# Patient Record
Sex: FEMALE | Race: ASIAN | NOT HISPANIC OR LATINO | URBAN - METROPOLITAN AREA
[De-identification: names, ages, dates, MRNs, and addresses within clinical notes are randomized per-mention and may not be internally consistent; named-entity substitution may affect disease eponyms.]

---

## 2017-08-07 ENCOUNTER — EMERGENCY (EMERGENCY)
Facility: HOSPITAL | Age: 32
LOS: 1 days | Discharge: HOME | End: 2017-08-07
Admitting: EMERGENCY MEDICINE
Payer: MEDICAID

## 2017-08-07 VITALS
OXYGEN SATURATION: 100 % | SYSTOLIC BLOOD PRESSURE: 119 MMHG | DIASTOLIC BLOOD PRESSURE: 68 MMHG | TEMPERATURE: 98 F | RESPIRATION RATE: 16 BRPM | HEART RATE: 85 BPM

## 2017-08-07 VITALS
RESPIRATION RATE: 18 BRPM | TEMPERATURE: 97 F | OXYGEN SATURATION: 100 % | HEART RATE: 94 BPM | SYSTOLIC BLOOD PRESSURE: 129 MMHG | DIASTOLIC BLOOD PRESSURE: 83 MMHG

## 2017-08-07 LAB
ALBUMIN SERPL ELPH-MCNC: 4.6 G/DL — SIGNIFICANT CHANGE UP (ref 3.3–5)
ALP SERPL-CCNC: 96 U/L — SIGNIFICANT CHANGE UP (ref 40–120)
ALT FLD-CCNC: 26 U/L — SIGNIFICANT CHANGE UP (ref 4–33)
APPEARANCE UR: CLEAR — SIGNIFICANT CHANGE UP
APTT BLD: 31.7 SEC — SIGNIFICANT CHANGE UP (ref 27.5–37.4)
AST SERPL-CCNC: 15 U/L — SIGNIFICANT CHANGE UP (ref 4–32)
BASOPHILS # BLD AUTO: 0.02 K/UL — SIGNIFICANT CHANGE UP (ref 0–0.2)
BASOPHILS NFR BLD AUTO: 0.2 % — SIGNIFICANT CHANGE UP (ref 0–2)
BILIRUB SERPL-MCNC: 0.2 MG/DL — SIGNIFICANT CHANGE UP (ref 0.2–1.2)
BILIRUB UR-MCNC: NEGATIVE — SIGNIFICANT CHANGE UP
BLD GP AB SCN SERPL QL: NEGATIVE — SIGNIFICANT CHANGE UP
BLOOD UR QL VISUAL: HIGH
BUN SERPL-MCNC: 10 MG/DL — SIGNIFICANT CHANGE UP (ref 7–23)
CALCIUM SERPL-MCNC: 9.3 MG/DL — SIGNIFICANT CHANGE UP (ref 8.4–10.5)
CHLORIDE SERPL-SCNC: 104 MMOL/L — SIGNIFICANT CHANGE UP (ref 98–107)
CO2 SERPL-SCNC: 22 MMOL/L — SIGNIFICANT CHANGE UP (ref 22–31)
COLOR SPEC: SIGNIFICANT CHANGE UP
CREAT SERPL-MCNC: 0.74 MG/DL — SIGNIFICANT CHANGE UP (ref 0.5–1.3)
EOSINOPHIL # BLD AUTO: 0.04 K/UL — SIGNIFICANT CHANGE UP (ref 0–0.5)
EOSINOPHIL NFR BLD AUTO: 0.5 % — SIGNIFICANT CHANGE UP (ref 0–6)
GLUCOSE SERPL-MCNC: 104 MG/DL — HIGH (ref 70–99)
GLUCOSE UR-MCNC: NEGATIVE — SIGNIFICANT CHANGE UP
HCG SERPL-ACNC: < 5 MIU/ML — SIGNIFICANT CHANGE UP
HCT VFR BLD CALC: 41.6 % — SIGNIFICANT CHANGE UP (ref 34.5–45)
HGB BLD-MCNC: 13.7 G/DL — SIGNIFICANT CHANGE UP (ref 11.5–15.5)
IMM GRANULOCYTES # BLD AUTO: 0.02 # — SIGNIFICANT CHANGE UP
IMM GRANULOCYTES NFR BLD AUTO: 0.2 % — SIGNIFICANT CHANGE UP (ref 0–1.5)
INR BLD: 0.98 — SIGNIFICANT CHANGE UP (ref 0.88–1.17)
KETONES UR-MCNC: NEGATIVE — SIGNIFICANT CHANGE UP
LEUKOCYTE ESTERASE UR-ACNC: NEGATIVE — SIGNIFICANT CHANGE UP
LYMPHOCYTES # BLD AUTO: 2.75 K/UL — SIGNIFICANT CHANGE UP (ref 1–3.3)
LYMPHOCYTES # BLD AUTO: 31.9 % — SIGNIFICANT CHANGE UP (ref 13–44)
MCHC RBC-ENTMCNC: 29.7 PG — SIGNIFICANT CHANGE UP (ref 27–34)
MCHC RBC-ENTMCNC: 32.9 % — SIGNIFICANT CHANGE UP (ref 32–36)
MCV RBC AUTO: 90.2 FL — SIGNIFICANT CHANGE UP (ref 80–100)
MONOCYTES # BLD AUTO: 0.6 K/UL — SIGNIFICANT CHANGE UP (ref 0–0.9)
MONOCYTES NFR BLD AUTO: 7 % — SIGNIFICANT CHANGE UP (ref 2–14)
MUCOUS THREADS # UR AUTO: SIGNIFICANT CHANGE UP
NEUTROPHILS # BLD AUTO: 5.2 K/UL — SIGNIFICANT CHANGE UP (ref 1.8–7.4)
NEUTROPHILS NFR BLD AUTO: 60.2 % — SIGNIFICANT CHANGE UP (ref 43–77)
NITRITE UR-MCNC: NEGATIVE — SIGNIFICANT CHANGE UP
NRBC # FLD: 0 — SIGNIFICANT CHANGE UP
PH UR: 7 — SIGNIFICANT CHANGE UP (ref 4.6–8)
PLATELET # BLD AUTO: 126 K/UL — LOW (ref 150–400)
PMV BLD: 12.8 FL — SIGNIFICANT CHANGE UP (ref 7–13)
POTASSIUM SERPL-MCNC: 4.1 MMOL/L — SIGNIFICANT CHANGE UP (ref 3.5–5.3)
POTASSIUM SERPL-SCNC: 4.1 MMOL/L — SIGNIFICANT CHANGE UP (ref 3.5–5.3)
PROT SERPL-MCNC: 7.8 G/DL — SIGNIFICANT CHANGE UP (ref 6–8.3)
PROT UR-MCNC: NEGATIVE — SIGNIFICANT CHANGE UP
PROTHROM AB SERPL-ACNC: 11 SEC — SIGNIFICANT CHANGE UP (ref 9.8–13.1)
RBC # BLD: 4.61 M/UL — SIGNIFICANT CHANGE UP (ref 3.8–5.2)
RBC # FLD: 13.4 % — SIGNIFICANT CHANGE UP (ref 10.3–14.5)
RBC CASTS # UR COMP ASSIST: SIGNIFICANT CHANGE UP (ref 0–?)
RH IG SCN BLD-IMP: POSITIVE — SIGNIFICANT CHANGE UP
SODIUM SERPL-SCNC: 140 MMOL/L — SIGNIFICANT CHANGE UP (ref 135–145)
SP GR SPEC: 1.01 — SIGNIFICANT CHANGE UP (ref 1–1.03)
SQUAMOUS # UR AUTO: SIGNIFICANT CHANGE UP
UROBILINOGEN FLD QL: NORMAL E.U. — SIGNIFICANT CHANGE UP (ref 0.1–0.2)
WBC # BLD: 8.63 K/UL — SIGNIFICANT CHANGE UP (ref 3.8–10.5)
WBC # FLD AUTO: 8.63 K/UL — SIGNIFICANT CHANGE UP (ref 3.8–10.5)
WBC UR QL: SIGNIFICANT CHANGE UP (ref 0–?)

## 2017-08-07 PROCEDURE — 76830 TRANSVAGINAL US NON-OB: CPT | Mod: 26

## 2017-08-07 PROCEDURE — 99285 EMERGENCY DEPT VISIT HI MDM: CPT

## 2017-08-07 RX ORDER — SODIUM CHLORIDE 9 MG/ML
1000 INJECTION INTRAMUSCULAR; INTRAVENOUS; SUBCUTANEOUS ONCE
Qty: 0 | Refills: 0 | Status: COMPLETED | OUTPATIENT
Start: 2017-08-07 | End: 2017-08-07

## 2017-08-07 RX ORDER — ACETAMINOPHEN 500 MG
650 TABLET ORAL ONCE
Qty: 0 | Refills: 0 | Status: COMPLETED | OUTPATIENT
Start: 2017-08-07 | End: 2017-08-07

## 2017-08-07 RX ADMIN — SODIUM CHLORIDE 1000 MILLILITER(S): 9 INJECTION INTRAMUSCULAR; INTRAVENOUS; SUBCUTANEOUS at 21:08

## 2017-08-07 NOTE — ED PROVIDER NOTE - FAMILY HISTORY
Father  Still living? Unknown  Family history of essential hypertension, Age at diagnosis: Age Unknown

## 2017-08-07 NOTE — ED PROVIDER NOTE - OBJECTIVE STATEMENT
32 year old woman with no history of fibroids and b/l ovarian cysts presents with RLQ pain. Patient states that always had normal periods with a little bit of pain until 4 months ago when she began to have severe RLQ pain and heavy bleeding soaking 7-8 pads per day for 5 days with menstruation. Today is day three of her period, and she states that the pain has been unbearable over the last 2 nights leading here to come to the ED. She endorses minimal relief with ibuprofen. Patient also endorses shortness of breath, tiredness and heart racing after walking 2 blocks over the past 2 months. She denies N/V/D, dysuria, SOB at rest, Chest pain, dizziness, change in vision. 32 year old female with pmh of fibroids and b/l ovarian cysts presents with RLQ pain. Patient states that always had normal periods with a little bit of pain until 4 months ago when she began to have severe RLQ pain and heavy bleeding soaking 7-8 pads per day for 5 days with menstruation. Today is day three of her period, and she states that the pain has been unbearable over the last 2 nights leading here to come to the ED. She endorses minimal relief with ibuprofen. Patient also endorses shortness of breath, tiredness and heart racing after walking 2 blocks over the past 2 months. She denies headache, nck pain, f/c/n/v/d, chest pain, urinary symptoms, numbness/weakness/tingling, recent travel, sick contact, social history

## 2017-08-07 NOTE — ED ADULT NURSE NOTE - OBJECTIVE STATEMENT
Pt received into intake room 4 co Vaginal bleeding and RLQ ABD pain. Patient states that always had normal periods with a little bit of pain until 4 months ago when she began to have severe RLQ pain and heavy bleeding soaking 7-8 pads per day for 5 days with menstruation. Today is day three of her period, and she states that the pain has been unbearable over the last 2 nights leading here to come to the ED. Pt also co dizzyness, light headed and palpitations.Pt A&Ox4, in NAD, patient states pain has resolved since arriving at ED. Pt denies N/V/D, dysuria, SOB at rest, CP, change in vision. Md evaluated, VS as noted, 20 G IV placed to L AC, labs sent. fluids infusing as ordered. Will continue to monitor closely.

## 2017-08-07 NOTE — ED ADULT TRIAGE NOTE - CHIEF COMPLAINT QUOTE
Pt c/o RLQ pain and vaginal bleeding, arrives with ultrasound report from PMD which shows bilateral hemorrhagic ovarian cysts.

## 2017-08-07 NOTE — ED PROVIDER NOTE - CARE PLAN
Principal Discharge DX:	Fibroids  Instructions for follow-up, activity and diet:	pls rest, drink plenty of fluids, take motrin every 8 hours , f/u with your obgyn asap, return for any worsening symptoms or any other concerning symptoms

## 2017-08-07 NOTE — ED PROVIDER NOTE - PLAN OF CARE
pls rest, drink plenty of fluids, take motrin every 8 hours , f/u with your obgyn asap, return for any worsening symptoms or any other concerning symptoms

## 2021-01-14 NOTE — ED ADULT TRIAGE NOTE - TEMPERATURE IN FAHRENHEIT (DEGREES F)
Received request via: Patient    Was the patient seen in the last year in this department? Yes    Does the patient have an active prescription (recently filled or refills available) for medication(s) requested? No  
97

## 2021-07-10 ENCOUNTER — INPATIENT (INPATIENT)
Facility: HOSPITAL | Age: 36
LOS: 1 days | Discharge: HOME | End: 2021-07-12
Attending: SURGERY | Admitting: SURGERY
Payer: COMMERCIAL

## 2021-07-10 ENCOUNTER — TRANSCRIPTION ENCOUNTER (OUTPATIENT)
Age: 36
End: 2021-07-10

## 2021-07-10 VITALS
TEMPERATURE: 98 F | DIASTOLIC BLOOD PRESSURE: 76 MMHG | RESPIRATION RATE: 17 BRPM | SYSTOLIC BLOOD PRESSURE: 137 MMHG | OXYGEN SATURATION: 100 % | HEART RATE: 123 BPM

## 2021-07-10 LAB
ALBUMIN SERPL ELPH-MCNC: 4 G/DL — SIGNIFICANT CHANGE UP (ref 3.3–5)
ALP SERPL-CCNC: 161 U/L — HIGH (ref 40–120)
ALT FLD-CCNC: 62 U/L — HIGH (ref 4–33)
ANION GAP SERPL CALC-SCNC: 14 MMOL/L — SIGNIFICANT CHANGE UP (ref 7–14)
APPEARANCE UR: ABNORMAL
APTT BLD: 29.6 SEC — SIGNIFICANT CHANGE UP (ref 27–36.3)
AST SERPL-CCNC: 27 U/L — SIGNIFICANT CHANGE UP (ref 4–32)
BACTERIA # UR AUTO: ABNORMAL
BASE EXCESS BLDV CALC-SCNC: -0.6 MMOL/L — SIGNIFICANT CHANGE UP (ref -3–2)
BILIRUB SERPL-MCNC: 0.4 MG/DL — SIGNIFICANT CHANGE UP (ref 0.2–1.2)
BILIRUB UR-MCNC: NEGATIVE — SIGNIFICANT CHANGE UP
BLD GP AB SCN SERPL QL: NEGATIVE — SIGNIFICANT CHANGE UP
BLOOD GAS VENOUS - CREATININE: 0.9 MG/DL — SIGNIFICANT CHANGE UP (ref 0.5–1.3)
BLOOD GAS VENOUS COMPREHENSIVE RESULT: SIGNIFICANT CHANGE UP
BUN SERPL-MCNC: 6 MG/DL — LOW (ref 7–23)
CALCIUM SERPL-MCNC: 9 MG/DL — SIGNIFICANT CHANGE UP (ref 8.4–10.5)
CHLORIDE BLDV-SCNC: 108 MMOL/L — SIGNIFICANT CHANGE UP (ref 96–108)
CHLORIDE SERPL-SCNC: 104 MMOL/L — SIGNIFICANT CHANGE UP (ref 98–107)
CO2 SERPL-SCNC: 20 MMOL/L — LOW (ref 22–31)
COLOR SPEC: SIGNIFICANT CHANGE UP
CREAT SERPL-MCNC: 0.77 MG/DL — SIGNIFICANT CHANGE UP (ref 0.5–1.3)
DIFF PNL FLD: NEGATIVE — SIGNIFICANT CHANGE UP
EPI CELLS # UR: 2 /HPF — SIGNIFICANT CHANGE UP (ref 0–5)
GAS PNL BLDV: 140 MMOL/L — SIGNIFICANT CHANGE UP (ref 136–146)
GLUCOSE BLDV-MCNC: 147 MG/DL — HIGH (ref 70–99)
GLUCOSE SERPL-MCNC: 137 MG/DL — HIGH (ref 70–99)
GLUCOSE UR QL: NEGATIVE — SIGNIFICANT CHANGE UP
HCO3 BLDV-SCNC: 23 MMOL/L — SIGNIFICANT CHANGE UP (ref 20–27)
HCT VFR BLD CALC: 34.1 % — LOW (ref 34.5–45)
HCT VFR BLDA CALC: 33.9 % — LOW (ref 34.5–46.5)
HGB BLD CALC-MCNC: 11 G/DL — LOW (ref 11.5–15.5)
HGB BLD-MCNC: 10.8 G/DL — LOW (ref 11.5–15.5)
HYALINE CASTS # UR AUTO: 1 /LPF — SIGNIFICANT CHANGE UP (ref 0–7)
INR BLD: 1.21 RATIO — HIGH (ref 0.88–1.16)
KETONES UR-MCNC: NEGATIVE — SIGNIFICANT CHANGE UP
LACTATE BLDV-MCNC: 1.9 MMOL/L — SIGNIFICANT CHANGE UP (ref 0.5–2)
LEUKOCYTE ESTERASE UR-ACNC: NEGATIVE — SIGNIFICANT CHANGE UP
MCHC RBC-ENTMCNC: 27.6 PG — SIGNIFICANT CHANGE UP (ref 27–34)
MCHC RBC-ENTMCNC: 31.7 GM/DL — LOW (ref 32–36)
MCV RBC AUTO: 87 FL — SIGNIFICANT CHANGE UP (ref 80–100)
NITRITE UR-MCNC: NEGATIVE — SIGNIFICANT CHANGE UP
NRBC # BLD: 0 /100 WBCS — SIGNIFICANT CHANGE UP
NRBC # FLD: 0 K/UL — SIGNIFICANT CHANGE UP
PCO2 BLDV: 45 MMHG — SIGNIFICANT CHANGE UP (ref 41–51)
PH BLDV: 7.35 — SIGNIFICANT CHANGE UP (ref 7.32–7.43)
PH UR: 6.5 — SIGNIFICANT CHANGE UP (ref 5–8)
PLATELET # BLD AUTO: 123 K/UL — LOW (ref 150–400)
PO2 BLDV: 26 MMHG — LOW (ref 35–40)
POTASSIUM BLDV-SCNC: 3.4 MMOL/L — SIGNIFICANT CHANGE UP (ref 3.4–4.5)
POTASSIUM SERPL-MCNC: 3.7 MMOL/L — SIGNIFICANT CHANGE UP (ref 3.5–5.3)
POTASSIUM SERPL-SCNC: 3.7 MMOL/L — SIGNIFICANT CHANGE UP (ref 3.5–5.3)
PROT SERPL-MCNC: 7.8 G/DL — SIGNIFICANT CHANGE UP (ref 6–8.3)
PROT UR-MCNC: ABNORMAL
PROTHROM AB SERPL-ACNC: 13.7 SEC — HIGH (ref 10.6–13.6)
RBC # BLD: 3.92 M/UL — SIGNIFICANT CHANGE UP (ref 3.8–5.2)
RBC # FLD: 13.9 % — SIGNIFICANT CHANGE UP (ref 10.3–14.5)
RBC CASTS # UR COMP ASSIST: 3 /HPF — SIGNIFICANT CHANGE UP (ref 0–4)
RH IG SCN BLD-IMP: POSITIVE — SIGNIFICANT CHANGE UP
SAO2 % BLDV: 36.2 % — LOW (ref 60–85)
SARS-COV-2 RNA SPEC QL NAA+PROBE: SIGNIFICANT CHANGE UP
SODIUM SERPL-SCNC: 138 MMOL/L — SIGNIFICANT CHANGE UP (ref 135–145)
SP GR SPEC: 1.01 — SIGNIFICANT CHANGE UP (ref 1.01–1.02)
UROBILINOGEN FLD QL: SIGNIFICANT CHANGE UP
WBC # BLD: 20.72 K/UL — HIGH (ref 3.8–10.5)
WBC # FLD AUTO: 20.72 K/UL — HIGH (ref 3.8–10.5)
WBC UR QL: 2 /HPF — SIGNIFICANT CHANGE UP (ref 0–5)

## 2021-07-10 PROCEDURE — 99285 EMERGENCY DEPT VISIT HI MDM: CPT | Mod: 25

## 2021-07-10 PROCEDURE — 93010 ELECTROCARDIOGRAM REPORT: CPT

## 2021-07-10 PROCEDURE — 74177 CT ABD & PELVIS W/CONTRAST: CPT | Mod: 26

## 2021-07-10 RX ORDER — VANCOMYCIN HCL 1 G
1000 VIAL (EA) INTRAVENOUS ONCE
Refills: 0 | Status: COMPLETED | OUTPATIENT
Start: 2021-07-10 | End: 2021-07-10

## 2021-07-10 RX ORDER — ACETAMINOPHEN 500 MG
1000 TABLET ORAL ONCE
Refills: 0 | Status: COMPLETED | OUTPATIENT
Start: 2021-07-10 | End: 2021-07-10

## 2021-07-10 RX ORDER — SODIUM CHLORIDE 9 MG/ML
1000 INJECTION INTRAMUSCULAR; INTRAVENOUS; SUBCUTANEOUS ONCE
Refills: 0 | Status: COMPLETED | OUTPATIENT
Start: 2021-07-10 | End: 2021-07-10

## 2021-07-10 RX ORDER — CEFOTETAN DISODIUM 1 G
2 VIAL (EA) INJECTION ONCE
Refills: 0 | Status: COMPLETED | OUTPATIENT
Start: 2021-07-10 | End: 2021-07-10

## 2021-07-10 RX ORDER — DIPHENHYDRAMINE HCL 50 MG
50 CAPSULE ORAL ONCE
Refills: 0 | Status: COMPLETED | OUTPATIENT
Start: 2021-07-10 | End: 2021-07-10

## 2021-07-10 RX ORDER — MORPHINE SULFATE 50 MG/1
4 CAPSULE, EXTENDED RELEASE ORAL ONCE
Refills: 0 | Status: DISCONTINUED | OUTPATIENT
Start: 2021-07-10 | End: 2021-07-10

## 2021-07-10 RX ADMIN — Medication 110 MILLIGRAM(S): at 23:51

## 2021-07-10 RX ADMIN — Medication 50 MILLIGRAM(S): at 20:01

## 2021-07-10 RX ADMIN — Medication 250 MILLIGRAM(S): at 18:57

## 2021-07-10 RX ADMIN — SODIUM CHLORIDE 1000 MILLILITER(S): 9 INJECTION INTRAMUSCULAR; INTRAVENOUS; SUBCUTANEOUS at 18:57

## 2021-07-10 RX ADMIN — Medication 400 MILLIGRAM(S): at 18:46

## 2021-07-10 RX ADMIN — MORPHINE SULFATE 4 MILLIGRAM(S): 50 CAPSULE, EXTENDED RELEASE ORAL at 22:54

## 2021-07-10 RX ADMIN — Medication 1000 MILLIGRAM(S): at 19:20

## 2021-07-10 NOTE — ED ADULT TRIAGE NOTE - CHIEF COMPLAINT QUOTE
Pt c/o right and left sided buttock pain starting 3 days ago. Pt denies injury. Pt tachycardic in triage, denies cp Pt c/o right and left sided buttock pain starting 3 days ago. Pt denies injury. Pt tachycardic in triage, denies cp    Abnormal EKG

## 2021-07-10 NOTE — ED PROVIDER NOTE - CHIEF COMPLAINT
The patient is a 36y Female complaining of  The patient is a 36y Female complaining of buttock pain.

## 2021-07-10 NOTE — ED ADULT NURSE NOTE - CHIEF COMPLAINT QUOTE
Pt c/o right and left sided buttock pain starting 3 days ago. Pt denies injury. Pt tachycardic in triage, denies cp    Abnormal EKG

## 2021-07-10 NOTE — ED PROVIDER NOTE - ATTENDING CONTRIBUTION TO CARE
Juan Alberto Nichols DO:  patient seen and evaluated with the resident.  I was present for key portions of the History & Physical, and I agree with the Impression & Plan. 37 yo f no reported pmh, pw buttock pain. reports three days of sx, r cleft, sharp, worse w/ pressure, no prior hx of similar sx. no recent procedures, no shaving, no anal insertive practices. arrives tachy, febrile. reported "ekg abrormal" however appears sinus tach. exam chaperone by Dr. Rubin. r cleft w/ erythema and induration with NO fluctuance noted. non diabetic. soft tissue infection evident, will treat empirically. eval for deep space infection. likely tba.

## 2021-07-10 NOTE — ED ADULT NURSE REASSESSMENT NOTE - NS ED NURSE REASSESS COMMENT FT1
Pt resting comfortably. Baseline mental status. Receiving IV abx as ordered. Verbalizing partial improvement in pain. Sinus tachycardia on monitor. Will continue to monitor.

## 2021-07-10 NOTE — ED PROVIDER NOTE - PHYSICAL EXAMINATION
GENERAL: Female in moderate distress.   HEENT: Normocephalic. PERRLA.   CHEST: Normal S1, S2. RRR. No m/r/g.  PULM: Good inspiratory effort. CTAB. No wheezes, crackles.   ABD: +BS. Soft, non-tender, non-distended.   EXT: No edema.   SKIN: Warmth, erythema, nonfluctuant raised GENERAL: Female in moderate distress.   HEENT: Normocephalic. PERRLA.   CHEST: Normal S1, S2. RRR. No m/r/g.  PULM: Good inspiratory effort. CTAB. No wheezes, crackles.   ABD: +BS. Soft, non-tender, non-distended.   EXT: No edema.   SKIN: Warm, tender, nonfluctuant raised erythematous area on right buttock, close to anus. marked with pen.

## 2021-07-10 NOTE — ED PROVIDER NOTE - OBJECTIVE STATEMENT
35yo F with PMH of fibroids and b/l ovarian cysts presents with Right-sided buttock pain. Patient states that 3 days ago, she started to have intense pain in her right buttocks, to the point where she cannot sit down. She has taken motrin and tylenol with relief for only 1-2 hours before pain starts again. Patient is sexually active, but denies anal intercourse. No known sick contacts, no travel. Denies headaches, nausea, vomiting, abdominal pain, weakness.

## 2021-07-10 NOTE — ED ADULT NURSE NOTE - OBJECTIVE STATEMENT
Faciltiator RN note- Received pt in spot 13. [id number]. C/o pain to right inner buttock since last weekend. Pt denies fevers at home but found to be rectally febrile on arrival to . Redness noted to inner right buttock. 20G placed to left AC, labs sent. Rpt given to primary RNs Brittney & Dominique. Will continue to monitor.

## 2021-07-10 NOTE — ED PROVIDER NOTE - CLINICAL SUMMARY MEDICAL DECISION MAKING FREE TEXT BOX
see attg attestation. 37yo F with PMH of fibroids and b/l ovarian cysts presents with Right-sided buttock pain. Patient tachycardic and febrile. On exam, warm, tender, nonfluctuant raised erythematous area on right buttock, close to anus. marked with pen. Concern for cellulitis vs. abscess. Workup includes cbc, cmp, vbg, coags, blood cx, t+s, ct a/p. Vancomycin given empirically.

## 2021-07-11 DIAGNOSIS — Z98.890 OTHER SPECIFIED POSTPROCEDURAL STATES: Chronic | ICD-10-CM

## 2021-07-11 DIAGNOSIS — L02.91 CUTANEOUS ABSCESS, UNSPECIFIED: ICD-10-CM

## 2021-07-11 PROBLEM — D25.9 LEIOMYOMA OF UTERUS, UNSPECIFIED: Chronic | Status: ACTIVE | Noted: 2017-08-07

## 2021-07-11 LAB
ANION GAP SERPL CALC-SCNC: 12 MMOL/L — SIGNIFICANT CHANGE UP (ref 7–14)
BLD GP AB SCN SERPL QL: NEGATIVE — SIGNIFICANT CHANGE UP
BUN SERPL-MCNC: 4 MG/DL — LOW (ref 7–23)
CALCIUM SERPL-MCNC: 8.3 MG/DL — LOW (ref 8.4–10.5)
CHLORIDE SERPL-SCNC: 110 MMOL/L — HIGH (ref 98–107)
CO2 SERPL-SCNC: 19 MMOL/L — LOW (ref 22–31)
CREAT SERPL-MCNC: 0.72 MG/DL — SIGNIFICANT CHANGE UP (ref 0.5–1.3)
GLUCOSE SERPL-MCNC: 119 MG/DL — HIGH (ref 70–99)
HCT VFR BLD CALC: 33.3 % — LOW (ref 34.5–45)
HGB BLD-MCNC: 10.3 G/DL — LOW (ref 11.5–15.5)
MAGNESIUM SERPL-MCNC: 2.1 MG/DL — SIGNIFICANT CHANGE UP (ref 1.6–2.6)
MCHC RBC-ENTMCNC: 27 PG — SIGNIFICANT CHANGE UP (ref 27–34)
MCHC RBC-ENTMCNC: 30.9 GM/DL — LOW (ref 32–36)
MCV RBC AUTO: 87.4 FL — SIGNIFICANT CHANGE UP (ref 80–100)
NRBC # BLD: 0 /100 WBCS — SIGNIFICANT CHANGE UP
NRBC # FLD: 0 K/UL — SIGNIFICANT CHANGE UP
PHOSPHATE SERPL-MCNC: 3.4 MG/DL — SIGNIFICANT CHANGE UP (ref 2.5–4.5)
PLATELET # BLD AUTO: 69 K/UL — LOW (ref 150–400)
POTASSIUM SERPL-MCNC: 3.3 MMOL/L — LOW (ref 3.5–5.3)
POTASSIUM SERPL-SCNC: 3.3 MMOL/L — LOW (ref 3.5–5.3)
RBC # BLD: 3.81 M/UL — SIGNIFICANT CHANGE UP (ref 3.8–5.2)
RBC # FLD: 14.1 % — SIGNIFICANT CHANGE UP (ref 10.3–14.5)
RH IG SCN BLD-IMP: POSITIVE — SIGNIFICANT CHANGE UP
SODIUM SERPL-SCNC: 141 MMOL/L — SIGNIFICANT CHANGE UP (ref 135–145)
WBC # BLD: 19.41 K/UL — HIGH (ref 3.8–10.5)
WBC # FLD AUTO: 19.41 K/UL — HIGH (ref 3.8–10.5)

## 2021-07-11 PROCEDURE — 46040 I&D ISCHIORCT&/PERIRCT ABSC: CPT | Mod: GC

## 2021-07-11 PROCEDURE — 99221 1ST HOSP IP/OBS SF/LOW 40: CPT | Mod: 25,57,GC

## 2021-07-11 PROCEDURE — 76856 US EXAM PELVIC COMPLETE: CPT | Mod: 26

## 2021-07-11 RX ORDER — FENTANYL CITRATE 50 UG/ML
50 INJECTION INTRAVENOUS ONCE
Refills: 0 | Status: DISCONTINUED | OUTPATIENT
Start: 2021-07-11 | End: 2021-07-11

## 2021-07-11 RX ORDER — ENOXAPARIN SODIUM 100 MG/ML
40 INJECTION SUBCUTANEOUS DAILY
Refills: 0 | Status: DISCONTINUED | OUTPATIENT
Start: 2021-07-11 | End: 2021-07-12

## 2021-07-11 RX ORDER — HYDROMORPHONE HYDROCHLORIDE 2 MG/ML
0.5 INJECTION INTRAMUSCULAR; INTRAVENOUS; SUBCUTANEOUS EVERY 4 HOURS
Refills: 0 | Status: DISCONTINUED | OUTPATIENT
Start: 2021-07-11 | End: 2021-07-11

## 2021-07-11 RX ORDER — SODIUM CHLORIDE 9 MG/ML
1000 INJECTION, SOLUTION INTRAVENOUS
Refills: 0 | Status: DISCONTINUED | OUTPATIENT
Start: 2021-07-11 | End: 2021-07-11

## 2021-07-11 RX ORDER — PIPERACILLIN AND TAZOBACTAM 4; .5 G/20ML; G/20ML
3.38 INJECTION, POWDER, LYOPHILIZED, FOR SOLUTION INTRAVENOUS ONCE
Refills: 0 | Status: COMPLETED | OUTPATIENT
Start: 2021-07-11 | End: 2021-07-11

## 2021-07-11 RX ORDER — SODIUM CHLORIDE 9 MG/ML
1000 INJECTION INTRAMUSCULAR; INTRAVENOUS; SUBCUTANEOUS ONCE
Refills: 0 | Status: COMPLETED | OUTPATIENT
Start: 2021-07-11 | End: 2021-07-11

## 2021-07-11 RX ORDER — MAGNESIUM HYDROXIDE 400 MG/1
30 TABLET, CHEWABLE ORAL EVERY 6 HOURS
Refills: 0 | Status: DISCONTINUED | OUTPATIENT
Start: 2021-07-11 | End: 2021-07-12

## 2021-07-11 RX ORDER — PIPERACILLIN AND TAZOBACTAM 4; .5 G/20ML; G/20ML
3.38 INJECTION, POWDER, LYOPHILIZED, FOR SOLUTION INTRAVENOUS EVERY 8 HOURS
Refills: 0 | Status: DISCONTINUED | OUTPATIENT
Start: 2021-07-11 | End: 2021-07-12

## 2021-07-11 RX ORDER — HYDROMORPHONE HYDROCHLORIDE 2 MG/ML
0.5 INJECTION INTRAMUSCULAR; INTRAVENOUS; SUBCUTANEOUS
Refills: 0 | Status: DISCONTINUED | OUTPATIENT
Start: 2021-07-11 | End: 2021-07-11

## 2021-07-11 RX ORDER — LIDOCAINE HYDROCHLORIDE AND EPINEPHRINE 10; 10 MG/ML; UG/ML
20 INJECTION, SOLUTION INFILTRATION; PERINEURAL ONCE
Refills: 0 | Status: DISCONTINUED | OUTPATIENT
Start: 2021-07-11 | End: 2021-07-11

## 2021-07-11 RX ORDER — OXYCODONE HYDROCHLORIDE 5 MG/1
10 TABLET ORAL EVERY 6 HOURS
Refills: 0 | Status: DISCONTINUED | OUTPATIENT
Start: 2021-07-11 | End: 2021-07-12

## 2021-07-11 RX ORDER — ACETAMINOPHEN 500 MG
975 TABLET ORAL EVERY 6 HOURS
Refills: 0 | Status: DISCONTINUED | OUTPATIENT
Start: 2021-07-11 | End: 2021-07-12

## 2021-07-11 RX ORDER — OXYCODONE HYDROCHLORIDE 5 MG/1
5 TABLET ORAL EVERY 6 HOURS
Refills: 0 | Status: DISCONTINUED | OUTPATIENT
Start: 2021-07-11 | End: 2021-07-12

## 2021-07-11 RX ORDER — HYDROMORPHONE HYDROCHLORIDE 2 MG/ML
1 INJECTION INTRAMUSCULAR; INTRAVENOUS; SUBCUTANEOUS
Refills: 0 | Status: DISCONTINUED | OUTPATIENT
Start: 2021-07-11 | End: 2021-07-11

## 2021-07-11 RX ORDER — SODIUM CHLORIDE 9 MG/ML
1000 INJECTION INTRAMUSCULAR; INTRAVENOUS; SUBCUTANEOUS
Refills: 0 | Status: DISCONTINUED | OUTPATIENT
Start: 2021-07-11 | End: 2021-07-11

## 2021-07-11 RX ORDER — ONDANSETRON 8 MG/1
4 TABLET, FILM COATED ORAL ONCE
Refills: 0 | Status: DISCONTINUED | OUTPATIENT
Start: 2021-07-11 | End: 2021-07-11

## 2021-07-11 RX ORDER — ACETAMINOPHEN 500 MG
975 TABLET ORAL ONCE
Refills: 0 | Status: COMPLETED | OUTPATIENT
Start: 2021-07-11 | End: 2021-07-11

## 2021-07-11 RX ADMIN — SODIUM CHLORIDE 1000 MILLILITER(S): 9 INJECTION INTRAMUSCULAR; INTRAVENOUS; SUBCUTANEOUS at 04:43

## 2021-07-11 RX ADMIN — Medication 975 MILLIGRAM(S): at 05:42

## 2021-07-11 RX ADMIN — PIPERACILLIN AND TAZOBACTAM 25 GRAM(S): 4; .5 INJECTION, POWDER, LYOPHILIZED, FOR SOLUTION INTRAVENOUS at 13:01

## 2021-07-11 RX ADMIN — MAGNESIUM HYDROXIDE 30 MILLILITER(S): 400 TABLET, CHEWABLE ORAL at 17:34

## 2021-07-11 RX ADMIN — Medication 100 GRAM(S): at 02:18

## 2021-07-11 RX ADMIN — FENTANYL CITRATE 50 MICROGRAM(S): 50 INJECTION INTRAVENOUS at 05:26

## 2021-07-11 RX ADMIN — Medication 2 GRAM(S): at 05:26

## 2021-07-11 RX ADMIN — PIPERACILLIN AND TAZOBACTAM 25 GRAM(S): 4; .5 INJECTION, POWDER, LYOPHILIZED, FOR SOLUTION INTRAVENOUS at 20:16

## 2021-07-11 RX ADMIN — FENTANYL CITRATE 50 MICROGRAM(S): 50 INJECTION INTRAVENOUS at 04:11

## 2021-07-11 RX ADMIN — MORPHINE SULFATE 4 MILLIGRAM(S): 50 CAPSULE, EXTENDED RELEASE ORAL at 05:26

## 2021-07-11 RX ADMIN — Medication 975 MILLIGRAM(S): at 17:34

## 2021-07-11 RX ADMIN — PIPERACILLIN AND TAZOBACTAM 200 GRAM(S): 4; .5 INJECTION, POWDER, LYOPHILIZED, FOR SOLUTION INTRAVENOUS at 05:26

## 2021-07-11 RX ADMIN — ENOXAPARIN SODIUM 40 MILLIGRAM(S): 100 INJECTION SUBCUTANEOUS at 13:01

## 2021-07-11 RX ADMIN — Medication 975 MILLIGRAM(S): at 13:01

## 2021-07-11 RX ADMIN — SODIUM CHLORIDE 125 MILLILITER(S): 9 INJECTION, SOLUTION INTRAVENOUS at 05:34

## 2021-07-11 RX ADMIN — Medication 975 MILLIGRAM(S): at 03:54

## 2021-07-11 NOTE — H&P ADULT - HISTORY OF PRESENT ILLNESS
36 year old F with no PMH presents with approximately one week of worsening right sided buttock pain. No bleeding per rectum, stooling normally daily. Endorses subjective fevers at home. No N/V.  No abdominal pain.  Patient currently menstruating.     In the ED, febrile and tachycardic. Exam limited due to pain but right buttock indurated with mild ertythema. Labs with leukocytosis to 20. CT with complex septated structure posterior to the uterus, likely ovarian in nature, concerning for tubo-ovarian abscess and Enhancing bilobed fluid collection measuring approximately 4.1 x 4.4 x 4.9 cm, concerning for perianal abscess. Pelvic ultrasound was done and septated structure posterior to uterus felt to be more likely to be a hydrosalpinx

## 2021-07-11 NOTE — CHART NOTE - NSCHARTNOTEFT_GEN_A_CORE
Surgery Post-Op Note    Pre-Op Dx: chago-rectal abscess   Procedure: Exam under anesthesia, anus    Incision and drainage of deep rectal abscess      Surgeon: Dr. Pia Anderson    SUBJECTIVE:  Pt seen and examined at the bedside 4 hours after surgery. Pt w/ no complaints. Denies F/C/N/V. Pain controlled with medication.     OBJECTIVE:  Vital Signs Last 24 Hrs  T(C): 37 (2021 12:55), Max: 38.6 (10 Jul 2021 17:15)  T(F): 98.6 (2021 12:55), Max: 101.5 (10 Jul 2021 17:15)  HR: 90 (2021 12:55) (85 - 125)  BP: 110/64 (2021 12:55) (91/59 - 137/76)  BP(mean): 67 (2021 10:45) (67 - 71)  RR: 17 (2021 12:55) (15 - 20)  SpO2: 96% (2021 12:55) (95% - 100%)    Physical Exam:  General: NAD, resting comfortably in bed  Neuro: A/O x 3, no focal deficits  Pulmonary: Nonlabored breathing, no respiratory distress  Cardiovascular: NSR  Abdominal: soft, ATTP. ND  Incision: C/D/I   Extremities: WWP    LABS:                        10.3   19.41 )-----------( 69       ( 2021 08:13 )             33.3     07-11    141  |  110<H>  |  4<L>  ----------------------------<  119<H>  3.3<L>   |  19<L>  |  0.72    Ca    8.3<L>      2021 08:14  Phos  3.4     07-11  Mg     2.10     07-11    TPro  7.8  /  Alb  4.0  /  TBili  0.4  /  DBili  x   /  AST  27  /  ALT  62<H>  /  AlkPhos  161<H>  07-10    PT/INR - ( 10 Jul 2021 17:26 )   PT: 13.7 sec;   INR: 1.21 ratio         PTT - ( 10 Jul 2021 17:26 )  PTT:29.6 sec  CAPILLARY BLOOD GLUCOSE        Urinalysis Basic - ( 10 Jul 2021 19:05 )    Color: Light Yellow / Appearance: Slightly Turbid / S.011 / pH: x  Gluc: x / Ketone: Negative  / Bili: Negative / Urobili: <2 mg/dL   Blood: x / Protein: Trace / Nitrite: Negative   Leuk Esterase: Negative / RBC: 3 /HPF / WBC 2 /HPF   Sq Epi: x / Non Sq Epi: 2 /HPF / Bacteria: Occasional      LIVER FUNCTIONS - ( 10 Jul 2021 17:26 )  Alb: 4.0 g/dL / Pro: 7.8 g/dL / ALK PHOS: 161 U/L / ALT: 62 U/L / AST: 27 U/L / GGT: x           ABO Interpretation: O ( @ 07:50)      IMAGING:    ASSESSMENT:36y Female now 4hours s/p     PLAN:  - Pain control  - Encourage IS  - Nausea control PRN  - Monitor vitals  - Diet: regular  - Monitor I+Os  - OOB/ Ambulate  - DVT ppx:      Team Surgery  [id number]

## 2021-07-11 NOTE — H&P ADULT - ATTENDING COMMENTS
Pain prohibits adequate examination and management of this perirectal abscess at bedside.  Proceed with EUA and operative drainage; will refer to CRS postop for further evaluation for [id number]-pm-hwe.  Priority right now is abscess drainage for infection source control.

## 2021-07-11 NOTE — BRIEF OPERATIVE NOTE - NSICDXBRIEFPROCEDURE_GEN_ALL_CORE_FT
PROCEDURES:  Exam under anesthesia, anus 11-Jul-2021 10:16:11  Renita Milian  Incision and drainage of deep rectal abscess 11-Jul-2021 10:16:27  Renita Milian

## 2021-07-11 NOTE — BRIEF OPERATIVE NOTE - OPERATION/FINDINGS
RIGHT posterolateral abscess pocket entered with copious purulent drainage.  Incision made about 5cm from the anal verge on RIGHT buttock.  Multiple loculations encountered and broken up with clamp and digit.  Counter incision made in posterior midline.  Tract made to connect incsions.  Penrose drain left in place connecting them.  Local anesthetic given at the beginning and end of case.  Dressing with 4x4 and abd pad.

## 2021-07-11 NOTE — H&P ADULT - ASSESSMENT
36 year old F with perianal abscess  -Admit to surgery   -IV antibiotics  -Booked and consented in [facility] for OR for EUA, incision and drainage of abscess. Patient aware she may have a drain in place post-operatively and also aware of the risk of fistula formation.       PADMINI BENTLEY Team Surgery [id number]

## 2021-07-11 NOTE — CONSULT NOTE ADULT - SUBJECTIVE AND OBJECTIVE BOX
TARAN RIBERA  36y  Female [id number]    HPI: 36 year old G0, LMP unknown (patient unable to recall but states she has normal periods) presenting with a week history of pain on her buttock when she sits/ bends/ movement. Patient states that the pain has been getting worse as the week progressed. She has tried using Advil and Ibuprofen but nothing seems to help. She reports that her last bowel movement was this morning. Denies any rectal bleeding. Patient denies any lightheadedness, headaches, fever, chills, chest pain, SOB, abdominal pain, nausea, vomiting, diarrhea vaginal bleeding, changes to vaginal discharge.      On presentation to the ED patient had a fever of 101.5 and was tachycardic to123. Patient denies having chills, palpitations, chest pain or SOB. Patient was given vancomycin, Doxycycline and Tylenol.        Name of GYN Physician: Dr. Pathak    POB:  [facility]    Pgyn: Denies fibroids, cysts, STI's, Abnormal pap smears, Currently sexually active , last intercourse 2 weeks.    PMH:None  PSH:Cystectomy 2018 ([facility])    Home meds: None   Social History:  Denies smoking use, drug use, alcohol use.     Vital Signs Last 24 Hrs  T(C): 37.6 (10 Jul 2021 22:35), Max: 38.6 (10 Jul 2021 17:15)  T(F): 99.6 (10 Jul 2021 22:35), Max: 101.5 (10 Jul 2021 17:15)  HR: 105 (10 Jul 2021 22:35) (105 - 125)  BP: 105/54 (10 Jul 2021 22:35) (105/54 - 137/76)  BP(mean): --  RR: 16 (10 Jul 2021 22:35) (16 - 18)  SpO2: 100% (10 Jul 2021 22:35) (100% - 100%)    Physical Exam:   General: sitting comfortably in bed, NAD   CV: RR, S1S2 present, no m/r/g  Lungs: CTA b/l  Back: No CVA tenderness  Abd: Soft, non-tender, non-distended.  Bowel sounds present.    :  No bleeding on pad.    External labia wnl.  Bimanual exam with cervical motion tenderness predominantly on left side, uterus wnl, adnexa non palpable b/l.    Speculum Exam: No active bleeding from os.  Physiologic discharge.     Rectal: Right sided pain on Buttock to palpation, 2x4 cm tense area noted. No erythema.  Ext: non-tender b/l, no edema     LABS:                            10.8   20.72 )-----------( 123      ( 10 Jul 2021 17:26 )             34.1     07-10    138  |  104  |  6<L>  ----------------------------<  137<H>  3.7   |  20<L>  |  0.77    Ca    9.0      10 Jul 2021 17:26    TPro  7.8  /  Alb  4.0  /  TBili  0.4  /  DBili  x   /  AST  27  /  ALT  62<H>  /  AlkPhos  161<H>  07-10    I&O's Detail    PT/INR - ( 10 Jul 2021 17:26 )   PT: 13.7 sec;   INR: 1.21 ratio         PTT - ( 10 Jul 2021 17:26 )  PTT:29.6 sec  Urinalysis Basic - ( 10 Jul 2021 19:05 )    Color: Light Yellow / Appearance: Slightly Turbid / S.011 / pH: x  Gluc: x / Ketone: Negative  / Bili: Negative / Urobili: <2 mg/dL   Blood: x / Protein: Trace / Nitrite: Negative   Leuk Esterase: Negative / RBC: 3 /HPF / WBC 2 /HPF   Sq Epi: x / Non Sq Epi: 2 /HPF / Bacteria: Occasional        RADIOLOGY & ADDITIONAL STUDIES:  TVUS: Uterus: 11.4 x 6.2 x 9.6 cm. Fibroid uterus.  Endometrium: 1 cm.Within normal limits.    Right ovary: 3.1 x 1.4 x 2.3 cm. Within normal limits. Normal arterial and venous waveforms.  Left ovary: 3.2 x 4.7 x 4.1 cm. Left adnexal fluid-filled tubular structure with a hypoechoic avascular structure measuring 4.6 x 5.8 x 4.1 cm.    Fluid: Right adnexal free fluid.    IMPRESSION:  Left adnexal fluid-filled tubular structure without surrounding hypervascularity, suggestive of hydrosalpinx. Consider further evaluation with MRI.      --- End of Report ---     CT: LOWER CHEST: Within normal limits.    LIVER: 5 mm calcified lesion, likely granuloma.  BILE DUCTS: Normal caliber.  GALLBLADDER: Within normal limits.  SPLEEN: Within normal limits.  PANCREAS: Within normal limits.  ADRENALS: Within normal limits.  KIDNEYS/URETERS: Within normal limits.    BLADDER: Bladder is displaced anteriorly and superiorly.  REPRODUCTIVE ORGANS: Enlarged fibroid uterus. Complex septated structure posterior to the uterus, likely ovarian in nature, concerning for tubo-ovarian abscess.    BOWEL: Narrowing of the cecum due to fibroid uterus and complex fluid containing septated mass without evidence of bowel obstruction. Appendix is normal.  PERITONEUM: No ascites.  VESSELS: Within normal limits.  RETROPERITONEUM/LYMPH NODES: No lymphadenopathy.  ABDOMINAL WALL:Enhancing bilobed fluid collection measuring approximately 4.1 x 4.4 x 4.9 cm, concerning for perianal abscess.  BONES: Within normal limits.    IMPRESSION:  Complex septated structure posterior to the uterus, likely ovarian in nature, concerning fortubo-ovarian abscess.    Enhancing bilobed fluid collection measuring approximately 4.1 x 4.4 x 4.9 cm, concerning for perianal abscess.        --- End of Report ---

## 2021-07-11 NOTE — ED ADULT NURSE REASSESSMENT NOTE - REASSESS COMMUNICATION
MD notifed BP 90's 2nd iv access placed, fluid bolus intiated. reassessment to follow/ED physician notified

## 2021-07-11 NOTE — H&P ADULT - NSHPPHYSICALEXAM_GEN_ALL_CORE
Uncomfortable appearing, awake, alert, conversant   Tachycardic   normal respiratory effort on room air, no wheezing  Abd soft, NT, ND  Rectal: induration and tenderness to palpation on the right buttock. TAMIKO not done secondary to pain  Ext: DARRELLP

## 2021-07-11 NOTE — ED ADULT NURSE REASSESSMENT NOTE - CONDITION
bp 90/50 pt crying in pain. Medicated as per orders. reassessment to follow Handoff to primary RN Shamar

## 2021-07-11 NOTE — CONSULT NOTE ADULT - ASSESSMENT
36 year old P0, LMP unknown presenting with pain in her buttock since monday that has been worsening over the week. On PE patient is tender on right buttock preventing her from sitting down and has some cervical motion tenderness. Patient was febrile ( 38.6) and has a leukocytosis (20.72) upon admission. TVUS shows a possible hydrosalpinx and CT scan showing a perianal abscess measuring 4.1x4.4x4.9 cm as well as a septated structure posterior to the uterus. Diagnosis is most likely a perianal abscess     -Patient unable to tolerate transvaginal ultrasound   -Consult Surgery    -Pending Surgery recs   -s/p vanc and doxy   - Continue to monitor vital signs   -Appreciate Care by ED      d/w Dr. Mercedes Drummond, PGY-1

## 2021-07-12 ENCOUNTER — TRANSCRIPTION ENCOUNTER (OUTPATIENT)
Age: 36
End: 2021-07-12

## 2021-07-12 VITALS
DIASTOLIC BLOOD PRESSURE: 68 MMHG | SYSTOLIC BLOOD PRESSURE: 115 MMHG | HEART RATE: 95 BPM | RESPIRATION RATE: 17 BRPM | TEMPERATURE: 98 F | OXYGEN SATURATION: 100 %

## 2021-07-12 LAB
ANION GAP SERPL CALC-SCNC: 12 MMOL/L — SIGNIFICANT CHANGE UP (ref 7–14)
BUN SERPL-MCNC: 9 MG/DL — SIGNIFICANT CHANGE UP (ref 7–23)
CALCIUM SERPL-MCNC: 9.4 MG/DL — SIGNIFICANT CHANGE UP (ref 8.4–10.5)
CHLORIDE SERPL-SCNC: 106 MMOL/L — SIGNIFICANT CHANGE UP (ref 98–107)
CO2 SERPL-SCNC: 23 MMOL/L — SIGNIFICANT CHANGE UP (ref 22–31)
COVID-19 SPIKE DOMAIN AB INTERP: POSITIVE
COVID-19 SPIKE DOMAIN ANTIBODY RESULT: >250 U/ML — HIGH
CREAT SERPL-MCNC: 0.76 MG/DL — SIGNIFICANT CHANGE UP (ref 0.5–1.3)
CULTURE RESULTS: SIGNIFICANT CHANGE UP
GLUCOSE SERPL-MCNC: 109 MG/DL — HIGH (ref 70–99)
HCT VFR BLD CALC: 33.7 % — LOW (ref 34.5–45)
HCT VFR BLD CALC: 34.8 % — SIGNIFICANT CHANGE UP (ref 34.5–45)
HGB BLD-MCNC: 10.4 G/DL — LOW (ref 11.5–15.5)
HGB BLD-MCNC: 10.8 G/DL — LOW (ref 11.5–15.5)
MAGNESIUM SERPL-MCNC: 2.6 MG/DL — SIGNIFICANT CHANGE UP (ref 1.6–2.6)
MANUAL SMEAR VERIFICATION: SIGNIFICANT CHANGE UP
MCHC RBC-ENTMCNC: 26.8 PG — LOW (ref 27–34)
MCHC RBC-ENTMCNC: 27.1 PG — SIGNIFICANT CHANGE UP (ref 27–34)
MCHC RBC-ENTMCNC: 30.9 GM/DL — LOW (ref 32–36)
MCHC RBC-ENTMCNC: 31 GM/DL — LOW (ref 32–36)
MCV RBC AUTO: 86.9 FL — SIGNIFICANT CHANGE UP (ref 80–100)
MCV RBC AUTO: 87.4 FL — SIGNIFICANT CHANGE UP (ref 80–100)
NRBC # BLD: 0 /100 WBCS — SIGNIFICANT CHANGE UP
NRBC # BLD: 0 /100 WBCS — SIGNIFICANT CHANGE UP
NRBC # FLD: 0 K/UL — SIGNIFICANT CHANGE UP
NRBC # FLD: 0 K/UL — SIGNIFICANT CHANGE UP
PHOSPHATE SERPL-MCNC: 1.8 MG/DL — LOW (ref 2.5–4.5)
PLAT MORPH BLD: ABNORMAL
PLATELET # BLD AUTO: 100 K/UL — LOW (ref 150–400)
PLATELET # BLD AUTO: SIGNIFICANT CHANGE UP K/UL (ref 150–400)
PLATELET CLUMP BLD QL SMEAR: ABNORMAL
PLATELET COUNT - ESTIMATE: NORMAL — SIGNIFICANT CHANGE UP
POTASSIUM SERPL-MCNC: 4 MMOL/L — SIGNIFICANT CHANGE UP (ref 3.5–5.3)
POTASSIUM SERPL-SCNC: 4 MMOL/L — SIGNIFICANT CHANGE UP (ref 3.5–5.3)
RBC # BLD: 3.88 M/UL — SIGNIFICANT CHANGE UP (ref 3.8–5.2)
RBC # BLD: 3.98 M/UL — SIGNIFICANT CHANGE UP (ref 3.8–5.2)
RBC # FLD: 14.1 % — SIGNIFICANT CHANGE UP (ref 10.3–14.5)
RBC # FLD: 14.2 % — SIGNIFICANT CHANGE UP (ref 10.3–14.5)
RBC BLD AUTO: SIGNIFICANT CHANGE UP
SARS-COV-2 IGG+IGM SERPL QL IA: >250 U/ML — HIGH
SARS-COV-2 IGG+IGM SERPL QL IA: POSITIVE
SODIUM SERPL-SCNC: 141 MMOL/L — SIGNIFICANT CHANGE UP (ref 135–145)
SPECIMEN SOURCE: SIGNIFICANT CHANGE UP
T PALLIDUM AB TITR SER: NEGATIVE — SIGNIFICANT CHANGE UP
WBC # BLD: 19.48 K/UL — HIGH (ref 3.8–10.5)
WBC # BLD: 21.43 K/UL — HIGH (ref 3.8–10.5)
WBC # FLD AUTO: 19.48 K/UL — HIGH (ref 3.8–10.5)
WBC # FLD AUTO: 21.43 K/UL — HIGH (ref 3.8–10.5)

## 2021-07-12 RX ORDER — MAGNESIUM HYDROXIDE 400 MG/1
30 TABLET, CHEWABLE ORAL
Qty: 0 | Refills: 0 | DISCHARGE
Start: 2021-07-12

## 2021-07-12 RX ORDER — ACETAMINOPHEN 500 MG
3 TABLET ORAL
Qty: 0 | Refills: 0 | DISCHARGE
Start: 2021-07-12

## 2021-07-12 RX ORDER — OXYCODONE HYDROCHLORIDE 5 MG/1
1 TABLET ORAL
Qty: 4 | Refills: 0
Start: 2021-07-12

## 2021-07-12 RX ADMIN — PIPERACILLIN AND TAZOBACTAM 25 GRAM(S): 4; .5 INJECTION, POWDER, LYOPHILIZED, FOR SOLUTION INTRAVENOUS at 04:04

## 2021-07-12 RX ADMIN — ENOXAPARIN SODIUM 40 MILLIGRAM(S): 100 INJECTION SUBCUTANEOUS at 12:19

## 2021-07-12 RX ADMIN — Medication 975 MILLIGRAM(S): at 00:04

## 2021-07-12 RX ADMIN — Medication 975 MILLIGRAM(S): at 00:08

## 2021-07-12 RX ADMIN — Medication 975 MILLIGRAM(S): at 18:24

## 2021-07-12 RX ADMIN — PIPERACILLIN AND TAZOBACTAM 25 GRAM(S): 4; .5 INJECTION, POWDER, LYOPHILIZED, FOR SOLUTION INTRAVENOUS at 12:18

## 2021-07-12 RX ADMIN — Medication 975 MILLIGRAM(S): at 07:07

## 2021-07-12 RX ADMIN — Medication 85 MILLIMOLE(S): at 12:18

## 2021-07-12 RX ADMIN — Medication 975 MILLIGRAM(S): at 07:14

## 2021-07-12 RX ADMIN — MAGNESIUM HYDROXIDE 30 MILLILITER(S): 400 TABLET, CHEWABLE ORAL at 07:08

## 2021-07-12 RX ADMIN — MAGNESIUM HYDROXIDE 30 MILLILITER(S): 400 TABLET, CHEWABLE ORAL at 00:06

## 2021-07-12 NOTE — DISCHARGE NOTE PROVIDER - NSDCCPCAREPLAN_GEN_ALL_CORE_FT
PRINCIPAL DISCHARGE DIAGNOSIS  Diagnosis: Abscess  Assessment and Plan of Treatment: WOUND CARE:  Please keep incisions clean and dry. Please do not Scrub or rub incisions. Do not use lotion or powder on incisions. You are being discharged home with a drain in place, to be removed as an outpatient.  BATHING: You may shower and/or sponge bathe. You may use warm soapy water in the shower and rinse, pat dry.  ACTIVITY: No heavy lifting or straining. Otherwise, you may return to your usual level of physical activity. If you are taking narcotic pain medication DO NOT drive a car, operate machinery or make important decisions.  DIET: Return to your usual diet.  NOTIFY YOUR SURGEON IF YOU HAVE: any bleeding that does not stop, any pus draining from your wound(s), any fever (over 100.4 F) persistent nausea/vomiting, or if your pain is not controlled on your discharge pain medications, unable to urinate.  Please follow up with your primary care physician in one week regarding your hospitalization, bring copies of your discharge paperwork.  Please follow up with your surgeon, Dr. Anderson as an outpatient, please call to schedule appointment  You will need to follow up with colorectal surgeon Dr Domingo as an outpatient, please call to schedule appointment   You are being discharged home on antibiotics, please complate full course as prescribed          PRINCIPAL DISCHARGE DIAGNOSIS  Diagnosis: Abscess  Assessment and Plan of Treatment: WOUND CARE:  Please keep incisions clean and dry. Please do not Scrub or rub incisions. Do not use lotion or powder on incisions. You are being discharged home with a drain in place, to be removed as an outpatient. You may cover drain with sterile 4x4 gauze and ABD and secure with tape  BATHING: You may shower and/or sponge bathe. You may use warm soapy water in the shower and rinse, pat dry.  ACTIVITY: No heavy lifting or straining. Otherwise, you may return to your usual level of physical activity. If you are taking narcotic pain medication DO NOT drive a car, operate machinery or make important decisions.  DIET: Return to your usual diet.  NOTIFY YOUR SURGEON IF YOU HAVE: any bleeding that does not stop, any pus draining from your wound(s), any fever (over 100.4 F) persistent nausea/vomiting, or if your pain is not controlled on your discharge pain medications, unable to urinate.  Please follow up with your primary care physician in one week regarding your hospitalization, bring copies of your discharge paperwork.  Please follow up with your surgeon, Dr. Anderson as an outpatient, please call to schedule appointment  You will need to follow up with colorectal surgeon Dr Domingo as an outpatient, please call to schedule appointment   You are being discharged home on antibiotics, please complate full course as prescribed          PRINCIPAL DISCHARGE DIAGNOSIS  Diagnosis: Abscess  Assessment and Plan of Treatment: WOUND CARE:  Please keep incisions clean and dry. Please do not Scrub or rub incisions. Do not use lotion or powder on incisions. You are being discharged home with a drain in place, to be removed as an outpatient. You may cover drain with sterile 4x4 gauze and ABD and secure with tape  BATHING: You may shower and/or sponge bathe.   ACTIVITY: No heavy lifting or straining. Otherwise, you may return to your usual level of physical activity. If you are taking narcotic pain medication DO NOT drive a car, operate machinery or make important decisions.  DIET: Return to your usual diet.  NOTIFY YOUR SURGEON IF YOU HAVE: any bleeding that does not stop, any pus draining from your wound(s), any fever (over 100.4 F) persistent nausea/vomiting, or if your pain is not controlled on your discharge pain medications, unable to urinate.  Please follow up with your primary care physician in one week regarding your hospitalization, bring copies of your discharge paperwork.  Please follow up with your surgeon, Dr. Anderson as an outpatient, please call to schedule appointment  You will need to follow up with colorectal surgeon Dr Domingo as an outpatient, please call to schedule appointment   You are being discharged home on antibiotics, please complate full course as prescribed          PRINCIPAL DISCHARGE DIAGNOSIS  Diagnosis: Abscess  Assessment and Plan of Treatment: WOUND CARE:  Please keep incisions clean and dry. Please do not Scrub or rub incisions. Do not use lotion or powder on incisions. You are being discharged home with a drain in place, to be removed as an outpatient. You may cover drain with sterile 4x4 gauze and ABD and secure with tape.  Perform sitz baths  Sitz bath instructions:  If you’re taking a sitz bath in the bathtub, the first step is to clean the tub.  Scrub the bathtub and rinse thoroughly.  Next, fill the tub with 3 to 4 inches of water. The water should be warm, but not hot enough to cause burns or discomfort.    Now, step into the tub and soak your perineum for 15 to 20 minutes. Bend your knees or, if possible, dangle your legs over the sides of the tub to keep them out of the water altogether.  When you get out of the bathtub, gently pat yourself dry with a clean cotton towel. Don’t rub or scrub the perineum, as this may cause pain and irritation.  Finish by rinsing the bathtub thoroughly.  BATHING: You may shower and/or sponge bathe.   ACTIVITY: No heavy lifting or straining. Otherwise, you may return to your usual level of physical activity. If you are taking narcotic pain medication DO NOT drive a car, operate machinery or make important decisions.  DIET: Return to your usual diet.  NOTIFY YOUR SURGEON IF YOU HAVE: any bleeding that does not stop, any pus draining from your wound(s), any fever (over 100.4 F) persistent nausea/vomiting, or if your pain is not controlled on your discharge pain medications, unable to urinate.  Please follow up with your primary care physician in one week regarding your hospitalization, bring copies of your discharge paperwork.  Please follow up with your surgeon, Dr. Anderson as an outpatient, please call to schedule appointment  You will need to follow up with colorectal surgeon Dr Domingo as an outpatient, please call to schedule appointment   You are being discharged home on antibiotics, please complate full course as prescribed          PRINCIPAL DISCHARGE DIAGNOSIS  Diagnosis: Abscess  Assessment and Plan of Treatment: WOUND CARE:  Please keep incisions clean and dry. Please do not Scrub or rub incisions. Do not use lotion or powder on incisions. You are being discharged home with a drain in place, to be removed as an outpatient. You may cover drain with sterile 4x4 gauze and ABD and secure with tape.  Perform sitz baths  Sitz bath instructions:  If you’re taking a sitz bath in the bathtub, the first step is to clean the tub.  Scrub the bathtub and rinse thoroughly.  Next, fill the tub with 3 to 4 inches of water. The water should be warm, but not hot enough to cause burns or discomfort.    Now, step into the tub and soak your perineum for 15 to 20 minutes. Bend your knees or, if possible, dangle your legs over the sides of the tub to keep them out of the water altogether.  When you get out of the bathtub, gently pat yourself dry with a clean cotton towel. Don’t rub or scrub the perineum, as this may cause pain and irritation.  Finish by rinsing the bathtub thoroughly.  BATHING: You may shower and/or sponge bathe.   ACTIVITY: No heavy lifting or straining. Otherwise, you may return to your usual level of physical activity. If you are taking narcotic pain medication DO NOT drive a car, operate machinery or make important decisions.  DIET: Return to your usual diet.  NOTIFY YOUR SURGEON IF YOU HAVE: any bleeding that does not stop, any pus draining from your wound(s), any fever (over 100.4 F) persistent nausea/vomiting, stop passing gas/have bowel movements, or worsening pain   Please follow up with your primary care physician in one week regarding your hospitalization, bring copies of your discharge paperwork.  Please follow up with your surgeon, Dr. Anderson as an outpatient, please call to schedule apt  You will need to follow up with colorectal surgeon Dr Domingo as an outpatient, please call to schedule appointment   You are being discharged home on antibiotics, please complete full course as prescribed   It is recommended you start a stool softener such as senna/colace while on narcotic pain meds

## 2021-07-12 NOTE — DISCHARGE NOTE NURSING/CASE MANAGEMENT/SOCIAL WORK - PATIENT PORTAL LINK FT
You can access the FollowMyHealth Patient Portal offered by  by registering at the following website: [email or web address]. By joining [facility]’s FollowMyHealth portal, you will also be able to view your health information using other applications (apps) compatible with our system.

## 2021-07-12 NOTE — PROGRESS NOTE ADULT - ATTENDING COMMENTS
s/p perirectal abscess drainage  plan  d/c home  po abx  recommend sitz bath  d/c penrose as outpatient  f/u with colorectal surgery    I have personally interviewed and examined this patient, reviewed pertinent labs and imaging, and discussed the case with colleagues, residents, and physician assistants on B Team rounds.    The active care issues are:  1. perirectal abscess    The Acute Care Surgery (B Team) Attending Group Practice:  Dr. Pia Anderson, Dr. Medardo Holbrook, Dr. Hamzah Bryant, Dr. Ramsey Mario,     urgent issues - spectra [phone]  nonurgent issues - [phone]  patient appointments or afterhours - [phone]

## 2021-07-12 NOTE — PROGRESS NOTE ADULT - SUBJECTIVE AND OBJECTIVE BOX
B Team Surgery Progress Note     SUBJECTIVE: Patient seen and examined at bedside by team. Pt with no complaints. Pain well controlled with pain meds    enoxaparin Injectable 40 milliGRAM(s) SubCutaneous daily  piperacillin/tazobactam IVPB.. 3.375 Gram(s) IV Intermittent every 8 hours      Vital Signs Last 24 Hrs  T(C): 36.6 (2021 06:13), Max: 37 (2021 12:55)  T(F): 97.9 (2021 06:13), Max: 98.6 (2021 12:55)  HR: 71 (2021 06:13) (69 - 98)  BP: 96/63 (2021 06:13) (96/63 - 110/64)  BP(mean): 67 (2021 10:45) (67 - 71)  RR: 16 (2021 06:13) (16 - 20)  SpO2: 100% (2021 06:13) (95% - 100%)  I&O's Detail    2021 07:01  -  2021 07:00  --------------------------------------------------------  IN:    Lactated Ringers: 250 mL    Oral Fluid: 760 mL  Total IN: 1010 mL    OUT:    Voided (mL): 500 mL  Total OUT: 500 mL    Total NET: 510 mL    Physical Exam:  General: NAD, resting comfortably in bed  Pulmonary: Nonlabored breathing, no respiratory distress  GI: Abd soft, ND/NT, penrose in place          LABS:                        10.3   19.41 )-----------( 69       ( 2021 08:13 )             33.3     07-11    141  |  110<H>  |  4<L>  ----------------------------<  119<H>  3.3<L>   |  19<L>  |  0.72    Ca    8.3<L>      2021 08:14  Phos  3.4     07-11  Mg     2.10     07-11    TPro  7.8  /  Alb  4.0  /  TBili  0.4  /  DBili  x   /  AST  27  /  ALT  62<H>  /  AlkPhos  161<H>  07-10    PT/INR - ( 10 Jul 2021 17:26 )   PT: 13.7 sec;   INR: 1.21 ratio         PTT - ( 10 Jul 2021 17:26 )  PTT:29.6 sec  Urinalysis Basic - ( 10 Jul 2021 19:05 )    Color: Light Yellow / Appearance: Slightly Turbid / S.011 / pH: x  Gluc: x / Ketone: Negative  / Bili: Negative / Urobili: <2 mg/dL   Blood: x / Protein: Trace / Nitrite: Negative   Leuk Esterase: Negative / RBC: 3 /HPF / WBC 2 /HPF   Sq Epi: x / Non Sq Epi: 2 /HPF / Bacteria: Occasional        RADIOLOGY & ADDITIONAL STUDIES:

## 2021-07-12 NOTE — DISCHARGE NOTE PROVIDER - NSDCMRMEDTOKEN_GEN_ALL_CORE_FT
acetaminophen 325 mg oral tablet: 3 tab(s) orally every 6 hours  Augmentin 875 mg-125 mg oral tablet: 1 tab(s) orally every 12 hours MDD:2 tab x7days  magnesium hydroxide 8% oral suspension: 30 milliliter(s) orally every 6 hours  oxyCODONE 5 mg oral tablet: 1 tab(s) orally every 6 hours, As Needed - for severe pain MDD:4   acetaminophen 325 mg oral tablet: 3 tab(s) orally every 6 hours  Augmentin 875 mg-125 mg oral tablet: 1 tab(s) orally every 12 hours MDD:2 tab x7days  magnesium hydroxide 8% oral suspension: 30 milliliter(s) orally every 6 hours  oxyCODONE 5 mg oral tablet: 1 tab(s) orally every 6 hours, As Needed - for severe pain MDD:4  Senna 8.6 mg oral tablet: 1 tab(s) orally once a day (at bedtime)

## 2021-07-12 NOTE — DISCHARGE NOTE PROVIDER - NSDCCPTREATMENT_GEN_ALL_CORE_FT
PRINCIPAL PROCEDURE  Procedure: Incision and drainage of deep rectal abscess  Findings and Treatment:

## 2021-07-12 NOTE — DISCHARGE NOTE PROVIDER - HOSPITAL COURSE
36 year old F with no PMH presents with approximately one week of worsening right sided buttock pain. No bleeding per rectum, stooling normally daily. Endorses subjective fevers at home. No N/V.  No abdominal pain.  Patient currently menstruating.     In the ED, febrile and tachycardic. Exam limited due to pain but right buttock indurated with mild erythema. Labs with leukocytosis to 20. CT with complex septated structure posterior to the uterus, likely ovarian in nature, concerning for tubo-ovarian abscess and Enhancing bilobed fluid collection measuring approximately 4.1 x 4.4 x 4.9 cm, concerning for perianal abscess. Pelvic ultrasound was done and septated structure posterior to uterus felt to be more likely to be a hydrosalpinx       Pt admitted to surgical service, started on antibiotics, and taken to the OR for Incision and drainage of deep rectal abscess. Pt tolerated procedure well    Post op pt's diet advanced as tolerated    Per Attending pt now stable for dc home. Pt tolerating diet and pain well controlled. Pt to follow up with surgeon Dr Anderson and colorectal surgeon Dr Domingo as an outpatient, instructed to call to schedule appointment

## 2021-07-12 NOTE — DISCHARGE NOTE NURSING/CASE MANAGEMENT/SOCIAL WORK - NSDPDISTO_GEN_ALL_CORE
Pt. is afebrile and offers no complaints. In no acute distress. Right buttock dressing with penrose drain: clean, dry and intact. Pt is ambulating, tolerating diet well, and voiding in adequate amounts./Home

## 2021-07-12 NOTE — DISCHARGE NOTE PROVIDER - CARE PROVIDER_API CALL
Pia Anderson)  Surgery; Surgical Critical Care  [address] [place] [address]  Phone: [phone]  Fax: [phone]  Follow Up Time:     Kevin Domingo)  ColonRectal Surgery; Surgery  [facility] [address]  Phone: [phone]  Fax: [phone]  Follow Up Time:

## 2021-07-12 NOTE — PROGRESS NOTE ADULT - ASSESSMENT
ASSESSMENT:36y Female with perirectal abscess s/p I&D    PLAN:  - Pain control  - Diet: regular  - Monitor I+Os  - OOB/ Ambulate  -DVT ppx  -dispo home on 7 day Aug w/penrose in place, f/u colorectal surgeon as outpatient     B Team Surgery  [id number]

## 2021-07-12 NOTE — DISCHARGE NOTE NURSING/CASE MANAGEMENT/SOCIAL WORK - NSDCPNINST_GEN_ALL_CORE
Make a follow up appointment with Dr. Anderson. Call MD if you develop a fever, or if there is redness, swelling, drainage or pain not relieved by pain medication. No heavy lifting, bending, or straining to move your bowels. Take over the counter stool softeners as needed to prevent constipation which may be caused by pain medication. Take your antibiotics as prescribed.

## 2021-07-12 NOTE — DISCHARGE NOTE PROVIDER - CARE PROVIDERS DIRECT ADDRESSES
[email or web address]

## 2021-07-13 PROBLEM — Z00.00 ENCOUNTER FOR PREVENTIVE HEALTH EXAMINATION: Status: ACTIVE | Noted: 2021-07-13

## 2021-07-13 LAB — CULTURE RESULTS: SIGNIFICANT CHANGE UP

## 2021-07-15 LAB
CULTURE RESULTS: SIGNIFICANT CHANGE UP
CULTURE RESULTS: SIGNIFICANT CHANGE UP
SPECIMEN SOURCE: SIGNIFICANT CHANGE UP
SPECIMEN SOURCE: SIGNIFICANT CHANGE UP

## 2021-07-22 ENCOUNTER — APPOINTMENT (OUTPATIENT)
Dept: COLORECTAL SURGERY | Facility: CLINIC | Age: 36
End: 2021-07-22

## 2021-07-22 ENCOUNTER — APPOINTMENT (OUTPATIENT)
Dept: COLORECTAL SURGERY | Facility: CLINIC | Age: 36
End: 2021-07-22
Payer: COMMERCIAL

## 2021-07-22 VITALS
DIASTOLIC BLOOD PRESSURE: 75 MMHG | WEIGHT: 165 LBS | HEART RATE: 98 BPM | SYSTOLIC BLOOD PRESSURE: 112 MMHG | OXYGEN SATURATION: 97 % | HEIGHT: 63 IN | TEMPERATURE: 97.5 F | RESPIRATION RATE: 15 BRPM | BODY MASS INDEX: 29.23 KG/M2

## 2021-07-22 DIAGNOSIS — Z78.9 OTHER SPECIFIED HEALTH STATUS: ICD-10-CM

## 2021-07-22 DIAGNOSIS — N85.8 OTHER SPECIFIED NONINFLAMMATORY DISORDERS OF UTERUS: ICD-10-CM

## 2021-07-22 PROCEDURE — 99243 OFF/OP CNSLTJ NEW/EST LOW 30: CPT

## 2021-07-22 PROCEDURE — 99072 ADDL SUPL MATRL&STAF TM PHE: CPT

## 2021-07-22 NOTE — HISTORY OF PRESENT ILLNESS
[FreeTextEntry1] : 36-year-old female with recent hospital admission for perirectal abscess. Patient required examination under anesthesia with drainage of that abscess. Penrose drain was placed. Patient currently reports significantly decreased pain. Minimal drainage no swelling no fevers or chills no nausea or vomiting. No family history of colon cancer or inflammatory bowel disease. No history of colonoscopy.

## 2021-07-22 NOTE — ASSESSMENT
[FreeTextEntry1] : Perirectal abscess\par -Penrose drain removed at bedside\par -Patient to apply a dry dressing to wound as needed\par -Sitz baths as needed\par -we had a long discussion about the possibility of fistula formation.\par -Patient will followup in 4-6 weeks for reevaluation and to determine if fistula is present\par -All questions were answered\par -Patient to call office with worsening symptoms

## 2021-07-22 NOTE — PHYSICAL EXAM
[Normal Breath Sounds] : Normal breath sounds [Normal Heart Sounds] : normal heart sounds [Normal Rate and Rhythm] : normal rate and rhythm [No Rash or Lesion] : No rash or lesion [Alert] : alert [Oriented to Person] : oriented to person [Oriented to Place] : oriented to place [Oriented to Time] : oriented to time [Anxious] : anxious [de-identified] : round, NT/ND, +BS [de-identified] : well nourished female [de-identified] : NC/AT [de-identified] : ERVIN/+ROM

## 2021-07-22 NOTE — CONSULT LETTER
[Dear  ___] : Dear  [unfilled], [Consult Letter:] : I had the pleasure of evaluating your patient, [unfilled]. [Please see my note below.] : Please see my note below. [Consult Closing:] : Thank you very much for allowing me to participate in the care of this patient.  If you have any questions, please do not hesitate to contact me. [Sincerely,] : Sincerely, [FreeTextEntry2] : Pia Anderson [FreeTextEntry3] : Kevin Domingo MD FACS\par Chief Colon and Rectal Surgery\par [facility]

## 2021-09-07 ENCOUNTER — APPOINTMENT (OUTPATIENT)
Dept: COLORECTAL SURGERY | Facility: CLINIC | Age: 36
End: 2021-09-07
Payer: COMMERCIAL

## 2021-09-07 PROCEDURE — 99213 OFFICE O/P EST LOW 20 MIN: CPT

## 2021-09-07 NOTE — HISTORY OF PRESENT ILLNESS
[FreeTextEntry1] : 36-year-old female with recent hospital admission for perirectal abscess. Patient required examination under anesthesia with drainage of that abscess. Penrose drain was placed. Patient currently reports significantly decreased pain. Minimal drainage no swelling no fevers or chills no nausea or vomiting. No family history of colon cancer or inflammatory bowel disease. No history of colonoscopy.\par \par September 7, 2021-patient denies drainage. Occasional itching. No fevers or chills no nausea or vomiting. No perianal pain or discomfort no perianal swelling no abdominal discomfort.  Tolerating diet without event no aggravating factors

## 2021-09-07 NOTE — ASSESSMENT
[FreeTextEntry1] : Perianal abscess status post incision and drainage\par -Continue to monitor for fistula formation\par -Likely no anal fissure\par -Patient followup in 3 months for wound check\par -All questions answered

## 2021-11-23 ENCOUNTER — APPOINTMENT (OUTPATIENT)
Dept: COLORECTAL SURGERY | Facility: CLINIC | Age: 36
End: 2021-11-23

## 2021-12-26 ENCOUNTER — EMERGENCY (EMERGENCY)
Facility: HOSPITAL | Age: 36
LOS: 1 days | Discharge: HOME | End: 2021-12-26
Admitting: STUDENT IN AN ORGANIZED HEALTH CARE EDUCATION/TRAINING PROGRAM
Payer: COMMERCIAL

## 2021-12-26 VITALS
HEART RATE: 78 BPM | RESPIRATION RATE: 18 BRPM | TEMPERATURE: 98 F | OXYGEN SATURATION: 98 % | DIASTOLIC BLOOD PRESSURE: 72 MMHG | SYSTOLIC BLOOD PRESSURE: 118 MMHG | HEIGHT: 63 IN

## 2021-12-26 DIAGNOSIS — Z98.890 OTHER SPECIFIED POSTPROCEDURAL STATES: Chronic | ICD-10-CM

## 2021-12-26 PROCEDURE — 99282 EMERGENCY DEPT VISIT SF MDM: CPT

## 2021-12-26 NOTE — ED PROVIDER NOTE - PATIENT PORTAL LINK FT
You can access the FollowMyHealth Patient Portal offered by [facility] by registering at the following website: [email or web address]. By joining [facility]’s FollowMyHealth portal, you will also be able to view your health information using other applications (apps) compatible with our system.

## 2021-12-27 LAB
FLUAV AG NPH QL: SIGNIFICANT CHANGE UP
FLUBV AG NPH QL: SIGNIFICANT CHANGE UP
RSV RNA NPH QL NAA+NON-PROBE: SIGNIFICANT CHANGE UP
SARS-COV-2 RNA SPEC QL NAA+PROBE: SIGNIFICANT CHANGE UP

## 2022-04-25 NOTE — ED PROVIDER NOTE - INCLUDE COVID-19 DISCHARGE INSTRUCTIONS
Patient stated that he was returning a phone call that was for the lab work that he received informed the patient that he was advised previously patient then stated it was for some other blood work   Informed patient there was nothing in the system noting a call was sent out <-------- Click here to INCLUDE CoVID-19 Discharge Instructions

## 2022-11-14 ENCOUNTER — TRANSCRIPTION ENCOUNTER (OUTPATIENT)
Age: 37
End: 2022-11-14

## 2022-11-14 ENCOUNTER — INPATIENT (INPATIENT)
Facility: HOSPITAL | Age: 37
LOS: 1 days | Discharge: HOME | End: 2022-11-16
Attending: SURGERY | Admitting: SURGERY

## 2022-11-14 VITALS
SYSTOLIC BLOOD PRESSURE: 155 MMHG | RESPIRATION RATE: 18 BRPM | OXYGEN SATURATION: 100 % | TEMPERATURE: 100 F | HEART RATE: 144 BPM | DIASTOLIC BLOOD PRESSURE: 56 MMHG

## 2022-11-14 DIAGNOSIS — Z98.890 OTHER SPECIFIED POSTPROCEDURAL STATES: Chronic | ICD-10-CM

## 2022-11-14 LAB
ALBUMIN SERPL ELPH-MCNC: 4.2 G/DL — SIGNIFICANT CHANGE UP (ref 3.3–5)
ALP SERPL-CCNC: 158 U/L — HIGH (ref 40–120)
ALT FLD-CCNC: 66 U/L — HIGH (ref 4–33)
ANION GAP SERPL CALC-SCNC: 12 MMOL/L — SIGNIFICANT CHANGE UP (ref 7–14)
APPEARANCE UR: ABNORMAL
APTT BLD: 31.5 SEC — SIGNIFICANT CHANGE UP (ref 27–36.3)
AST SERPL-CCNC: 28 U/L — SIGNIFICANT CHANGE UP (ref 4–32)
BASOPHILS # BLD AUTO: 0 K/UL — SIGNIFICANT CHANGE UP (ref 0–0.2)
BASOPHILS NFR BLD AUTO: 0 % — SIGNIFICANT CHANGE UP (ref 0–2)
BILIRUB SERPL-MCNC: 0.6 MG/DL — SIGNIFICANT CHANGE UP (ref 0.2–1.2)
BILIRUB UR-MCNC: NEGATIVE — SIGNIFICANT CHANGE UP
BLD GP AB SCN SERPL QL: NEGATIVE — SIGNIFICANT CHANGE UP
BLOOD GAS VENOUS COMPREHENSIVE RESULT: SIGNIFICANT CHANGE UP
BUN SERPL-MCNC: 8 MG/DL — SIGNIFICANT CHANGE UP (ref 7–23)
CALCIUM SERPL-MCNC: 9.4 MG/DL — SIGNIFICANT CHANGE UP (ref 8.4–10.5)
CHLORIDE SERPL-SCNC: 104 MMOL/L — SIGNIFICANT CHANGE UP (ref 98–107)
CO2 SERPL-SCNC: 24 MMOL/L — SIGNIFICANT CHANGE UP (ref 22–31)
COLOR SPEC: SIGNIFICANT CHANGE UP
CREAT SERPL-MCNC: 0.73 MG/DL — SIGNIFICANT CHANGE UP (ref 0.5–1.3)
DIFF PNL FLD: NEGATIVE — SIGNIFICANT CHANGE UP
EGFR: 110 ML/MIN/1.73M2 — SIGNIFICANT CHANGE UP
EOSINOPHIL # BLD AUTO: 0 K/UL — SIGNIFICANT CHANGE UP (ref 0–0.5)
EOSINOPHIL NFR BLD AUTO: 0 % — SIGNIFICANT CHANGE UP (ref 0–6)
FLUAV AG NPH QL: SIGNIFICANT CHANGE UP
FLUBV AG NPH QL: SIGNIFICANT CHANGE UP
GLUCOSE SERPL-MCNC: 109 MG/DL — HIGH (ref 70–99)
GLUCOSE UR QL: NEGATIVE — SIGNIFICANT CHANGE UP
HCT VFR BLD CALC: 34.8 % — SIGNIFICANT CHANGE UP (ref 34.5–45)
HGB BLD-MCNC: 10.9 G/DL — LOW (ref 11.5–15.5)
IANC: 17.08 K/UL — HIGH (ref 1.8–7.4)
INR BLD: 1.33 RATIO — HIGH (ref 0.88–1.16)
KETONES UR-MCNC: NEGATIVE — SIGNIFICANT CHANGE UP
LEUKOCYTE ESTERASE UR-ACNC: NEGATIVE — SIGNIFICANT CHANGE UP
LYMPHOCYTES # BLD AUTO: 12.2 % — LOW (ref 13–44)
LYMPHOCYTES # BLD AUTO: 2.65 K/UL — SIGNIFICANT CHANGE UP (ref 1–3.3)
MCHC RBC-ENTMCNC: 27.3 PG — SIGNIFICANT CHANGE UP (ref 27–34)
MCHC RBC-ENTMCNC: 31.3 GM/DL — LOW (ref 32–36)
MCV RBC AUTO: 87 FL — SIGNIFICANT CHANGE UP (ref 80–100)
MONOCYTES # BLD AUTO: 1.13 K/UL — HIGH (ref 0–0.9)
MONOCYTES NFR BLD AUTO: 5.2 % — SIGNIFICANT CHANGE UP (ref 2–14)
NEUTROPHILS # BLD AUTO: 17.59 K/UL — HIGH (ref 1.8–7.4)
NEUTROPHILS NFR BLD AUTO: 80.9 % — HIGH (ref 43–77)
NITRITE UR-MCNC: NEGATIVE — SIGNIFICANT CHANGE UP
PH UR: 6.5 — SIGNIFICANT CHANGE UP (ref 5–8)
PLATELET # BLD AUTO: SIGNIFICANT CHANGE UP K/UL (ref 150–400)
POTASSIUM SERPL-MCNC: 3.3 MMOL/L — LOW (ref 3.5–5.3)
POTASSIUM SERPL-SCNC: 3.3 MMOL/L — LOW (ref 3.5–5.3)
PROT SERPL-MCNC: 8.1 G/DL — SIGNIFICANT CHANGE UP (ref 6–8.3)
PROT UR-MCNC: ABNORMAL
PROTHROM AB SERPL-ACNC: 15.5 SEC — HIGH (ref 10.5–13.4)
RBC # BLD: 4 M/UL — SIGNIFICANT CHANGE UP (ref 3.8–5.2)
RBC # FLD: 13.5 % — SIGNIFICANT CHANGE UP (ref 10.3–14.5)
RH IG SCN BLD-IMP: POSITIVE — SIGNIFICANT CHANGE UP
RSV RNA NPH QL NAA+NON-PROBE: SIGNIFICANT CHANGE UP
SARS-COV-2 RNA SPEC QL NAA+PROBE: SIGNIFICANT CHANGE UP
SODIUM SERPL-SCNC: 140 MMOL/L — SIGNIFICANT CHANGE UP (ref 135–145)
SP GR SPEC: 1.01 — SIGNIFICANT CHANGE UP (ref 1.01–1.05)
UROBILINOGEN FLD QL: SIGNIFICANT CHANGE UP
WBC # BLD: 21.74 K/UL — HIGH (ref 3.8–10.5)
WBC # FLD AUTO: 21.74 K/UL — HIGH (ref 3.8–10.5)

## 2022-11-14 PROCEDURE — 93010 ELECTROCARDIOGRAM REPORT: CPT

## 2022-11-14 PROCEDURE — 99285 EMERGENCY DEPT VISIT HI MDM: CPT

## 2022-11-14 RX ORDER — ACETAMINOPHEN 500 MG
1000 TABLET ORAL ONCE
Refills: 0 | Status: COMPLETED | OUTPATIENT
Start: 2022-11-14 | End: 2022-11-14

## 2022-11-14 RX ORDER — METRONIDAZOLE 500 MG
TABLET ORAL
Refills: 0 | Status: DISCONTINUED | OUTPATIENT
Start: 2022-11-14 | End: 2022-11-15

## 2022-11-14 RX ORDER — METRONIDAZOLE 500 MG
500 TABLET ORAL ONCE
Refills: 0 | Status: COMPLETED | OUTPATIENT
Start: 2022-11-14 | End: 2022-11-14

## 2022-11-14 RX ORDER — MORPHINE SULFATE 50 MG/1
2 CAPSULE, EXTENDED RELEASE ORAL ONCE
Refills: 0 | Status: DISCONTINUED | OUTPATIENT
Start: 2022-11-14 | End: 2022-11-14

## 2022-11-14 RX ORDER — METRONIDAZOLE 500 MG
500 TABLET ORAL EVERY 8 HOURS
Refills: 0 | Status: DISCONTINUED | OUTPATIENT
Start: 2022-11-15 | End: 2022-11-15

## 2022-11-14 RX ORDER — SODIUM CHLORIDE 9 MG/ML
1000 INJECTION, SOLUTION INTRAVENOUS ONCE
Refills: 0 | Status: COMPLETED | OUTPATIENT
Start: 2022-11-14 | End: 2022-11-14

## 2022-11-14 RX ORDER — MORPHINE SULFATE 50 MG/1
4 CAPSULE, EXTENDED RELEASE ORAL ONCE
Refills: 0 | Status: DISCONTINUED | OUTPATIENT
Start: 2022-11-14 | End: 2022-11-14

## 2022-11-14 RX ORDER — CEFTRIAXONE 500 MG/1
1000 INJECTION, POWDER, FOR SOLUTION INTRAMUSCULAR; INTRAVENOUS ONCE
Refills: 0 | Status: COMPLETED | OUTPATIENT
Start: 2022-11-14 | End: 2022-11-14

## 2022-11-14 RX ADMIN — MORPHINE SULFATE 2 MILLIGRAM(S): 50 CAPSULE, EXTENDED RELEASE ORAL at 21:20

## 2022-11-14 RX ADMIN — Medication 400 MILLIGRAM(S): at 21:20

## 2022-11-14 RX ADMIN — CEFTRIAXONE 100 MILLIGRAM(S): 500 INJECTION, POWDER, FOR SOLUTION INTRAMUSCULAR; INTRAVENOUS at 21:20

## 2022-11-14 RX ADMIN — SODIUM CHLORIDE 1000 MILLILITER(S): 9 INJECTION, SOLUTION INTRAVENOUS at 21:20

## 2022-11-14 RX ADMIN — MORPHINE SULFATE 4 MILLIGRAM(S): 50 CAPSULE, EXTENDED RELEASE ORAL at 23:07

## 2022-11-14 RX ADMIN — Medication 100 MILLIGRAM(S): at 22:10

## 2022-11-14 NOTE — ED PROVIDER NOTE - ATTENDING CONTRIBUTION TO CARE
DR. BLOCH, ATTENDING MD-  I performed a face to face bedside interview with patient regarding history of present illness, review of symptoms and past medical history. I completed an independent physical exam.  I have discussed patient's plan of care with the resident.   patient examined very uncomfortable unable to sit.  HEENT normal heart sounds S1-S2, lungs clear, abdomen soft nontender, buttocks with erythema and firmness right near the anus bilaterally near the anal verge, extremely tender to palpation..  Extremities no edema pulses intact.  Rectal temp 100.9

## 2022-11-14 NOTE — ED ADULT TRIAGE NOTE - CHIEF COMPLAINT QUOTE
Pt with chago rectal abscess . pt states had it ID last year now it has returned. Pt co pain unable to sit.

## 2022-11-14 NOTE — ED PROVIDER NOTE - OTHER FINDINGS
reg reg, sinus tach w/o gross evidence of ischmia reg reg, sinus tach w/o gross evidence of ischemia

## 2022-11-14 NOTE — ED PROVIDER NOTE - OBJECTIVE STATEMENT
Tachy to 140 in triage note. Pt walking in room in NAD. 12lead w/ sinus tach.     36 yo w/ hx of perianal abscess p/w ***    Last here w/ dr Mario w/ similar c/c, found to have perianal abscess w/ I and D and abx (vanc, zosyn, cefotetan) w/ no complications. Tachy to 140 in triage note. Pt walking in room in NAD. 12lead w/ sinus tach. Rectal 100.9.     38 yo w/ hx of perianal abscess p/w rectal pain. 4 days ago pt started feeling a pressure in her R buttock.  noted 2 days ago two red bumps located on L and R buttock, above gluteal cleft. Subjective fevers at home. No n/v/d. Stooling normally. Cannot sit d/t pain. 10/10. taking tylenol and ibuprofen w/ minimal relief. no drainage noted.     Last here w/ dr Mario w/ similar c/c, found to have perianal abscess w/ I and D and abx (vanc, zosyn, cefotetan) w/ no complications.

## 2022-11-14 NOTE — ED PROVIDER NOTE - PHYSICAL EXAMINATION
CONSTITUTIONAL: painful-appearing, cannot sit down in room on bed  SKIN: Warm dry. Noted L and R erythematous nonfluctuant above gluteal cleft, not directly involving rectum   HEAD: NCAT  EYES: no scleral icterus, conjunctiva pink  NECK: Supple; non tender  CARD: RRR, no murmurs.  RESP: clear to ausculation b/l. No crackles or wheezing.  ABD: soft, non-tender, non-distended, no rebound or guarding.  MSK: no pedal edema, no calf tenderness  NEURO: sensation grossly intact b/l limbs   PSYCH: Cooperative, appropriate.

## 2022-11-14 NOTE — ED PROVIDER NOTE - CLINICAL SUMMARY MEDICAL DECISION MAKING FREE TEXT BOX
37 yof w/ known hx of perianal abccess treated w/ I and D and abx p/w rectal pain, found to be in sinus tach w/ fever and erythematous raised masses above glut cleft.     ddx includes perianal vs rectal abscess vs complicated cellulitis. Fever noted w/ sinus tach, will initate sepsis w/up w/ possible bacteremia resultant from translocation of bacteria close to a vascular rectal bed.    12lead  forego chest xray given no pulm findings  CT to stage for surg and depth of infection   cbc cmp coags TS  Fluids and pain, fever management     surg 2 c

## 2022-11-14 NOTE — ED PROVIDER NOTE - NS ED ROS FT
Constitutional: (-) chills, (-) lethargy  Eyes:  (-) eye pain (-) visual changes  ENMT: (-) nasal discharge, (-) sore throat.   Cardiac: (-) chest pain   Respiratory:  (-) cough   GI:  (-) nausea (-) vomiting (-) diarrhea  :  (-) dysuria   MS:  (-) back pain   Neuro: (-) numbness (-) tingling   Skin:  (-) rash  Except as documented in the HPI,  all other systems are negative

## 2022-11-15 DIAGNOSIS — K61.1 RECTAL ABSCESS: ICD-10-CM

## 2022-11-15 LAB
ANION GAP SERPL CALC-SCNC: 12 MMOL/L — SIGNIFICANT CHANGE UP (ref 7–14)
ANION GAP SERPL CALC-SCNC: 12 MMOL/L — SIGNIFICANT CHANGE UP (ref 7–14)
APTT BLD: 26.5 SEC — LOW (ref 27–36.3)
APTT BLD: 28.4 SEC — SIGNIFICANT CHANGE UP (ref 27–36.3)
BUN SERPL-MCNC: 5 MG/DL — LOW (ref 7–23)
BUN SERPL-MCNC: 6 MG/DL — LOW (ref 7–23)
CALCIUM SERPL-MCNC: 8.9 MG/DL — SIGNIFICANT CHANGE UP (ref 8.4–10.5)
CALCIUM SERPL-MCNC: 8.9 MG/DL — SIGNIFICANT CHANGE UP (ref 8.4–10.5)
CHLORIDE SERPL-SCNC: 104 MMOL/L — SIGNIFICANT CHANGE UP (ref 98–107)
CHLORIDE SERPL-SCNC: 104 MMOL/L — SIGNIFICANT CHANGE UP (ref 98–107)
CO2 SERPL-SCNC: 22 MMOL/L — SIGNIFICANT CHANGE UP (ref 22–31)
CO2 SERPL-SCNC: 24 MMOL/L — SIGNIFICANT CHANGE UP (ref 22–31)
CREAT SERPL-MCNC: 0.65 MG/DL — SIGNIFICANT CHANGE UP (ref 0.5–1.3)
CREAT SERPL-MCNC: 0.69 MG/DL — SIGNIFICANT CHANGE UP (ref 0.5–1.3)
EGFR: 116 ML/MIN/1.73M2 — SIGNIFICANT CHANGE UP
EGFR: 118 ML/MIN/1.73M2 — SIGNIFICANT CHANGE UP
GLUCOSE SERPL-MCNC: 129 MG/DL — HIGH (ref 70–99)
GLUCOSE SERPL-MCNC: 155 MG/DL — HIGH (ref 70–99)
HCG SERPL-ACNC: <5 MIU/ML — SIGNIFICANT CHANGE UP
HCT VFR BLD CALC: 29 % — LOW (ref 34.5–45)
HCT VFR BLD CALC: 30.7 % — LOW (ref 34.5–45)
HGB BLD-MCNC: 9.1 G/DL — LOW (ref 11.5–15.5)
HGB BLD-MCNC: 9.7 G/DL — LOW (ref 11.5–15.5)
INR BLD: 1.39 RATIO — HIGH (ref 0.88–1.16)
INR BLD: 1.4 RATIO — HIGH (ref 0.88–1.16)
MAGNESIUM SERPL-MCNC: 1.9 MG/DL — SIGNIFICANT CHANGE UP (ref 1.6–2.6)
MAGNESIUM SERPL-MCNC: 2 MG/DL — SIGNIFICANT CHANGE UP (ref 1.6–2.6)
MCHC RBC-ENTMCNC: 27.2 PG — SIGNIFICANT CHANGE UP (ref 27–34)
MCHC RBC-ENTMCNC: 27.8 PG — SIGNIFICANT CHANGE UP (ref 27–34)
MCHC RBC-ENTMCNC: 31.4 GM/DL — LOW (ref 32–36)
MCHC RBC-ENTMCNC: 31.6 GM/DL — LOW (ref 32–36)
MCV RBC AUTO: 86.6 FL — SIGNIFICANT CHANGE UP (ref 80–100)
MCV RBC AUTO: 88 FL — SIGNIFICANT CHANGE UP (ref 80–100)
NRBC # BLD: 0 /100 WBCS — SIGNIFICANT CHANGE UP (ref 0–0)
NRBC # BLD: 0 /100 WBCS — SIGNIFICANT CHANGE UP (ref 0–0)
NRBC # FLD: 0 K/UL — SIGNIFICANT CHANGE UP (ref 0–0)
NRBC # FLD: 0 K/UL — SIGNIFICANT CHANGE UP (ref 0–0)
PHOSPHATE SERPL-MCNC: 1.7 MG/DL — LOW (ref 2.5–4.5)
PHOSPHATE SERPL-MCNC: 3.8 MG/DL — SIGNIFICANT CHANGE UP (ref 2.5–4.5)
PLAT MORPH BLD: NORMAL — SIGNIFICANT CHANGE UP
PLATELET # BLD AUTO: 53 K/UL — LOW (ref 150–400)
PLATELET # BLD AUTO: 98 K/UL — LOW (ref 150–400)
POTASSIUM SERPL-MCNC: 2.9 MMOL/L — CRITICAL LOW (ref 3.5–5.3)
POTASSIUM SERPL-MCNC: 3.2 MMOL/L — LOW (ref 3.5–5.3)
POTASSIUM SERPL-SCNC: 2.9 MMOL/L — CRITICAL LOW (ref 3.5–5.3)
POTASSIUM SERPL-SCNC: 3.2 MMOL/L — LOW (ref 3.5–5.3)
PROTHROM AB SERPL-ACNC: 16.2 SEC — HIGH (ref 10.5–13.4)
PROTHROM AB SERPL-ACNC: 16.3 SEC — HIGH (ref 10.5–13.4)
RBC # BLD: 3.35 M/UL — LOW (ref 3.8–5.2)
RBC # BLD: 3.49 M/UL — LOW (ref 3.8–5.2)
RBC # FLD: 13.7 % — SIGNIFICANT CHANGE UP (ref 10.3–14.5)
RBC # FLD: 13.8 % — SIGNIFICANT CHANGE UP (ref 10.3–14.5)
RBC BLD AUTO: NORMAL — SIGNIFICANT CHANGE UP
SODIUM SERPL-SCNC: 138 MMOL/L — SIGNIFICANT CHANGE UP (ref 135–145)
SODIUM SERPL-SCNC: 140 MMOL/L — SIGNIFICANT CHANGE UP (ref 135–145)
WBC # BLD: 19.07 K/UL — HIGH (ref 3.8–10.5)
WBC # BLD: 20.21 K/UL — HIGH (ref 3.8–10.5)
WBC # FLD AUTO: 19.07 K/UL — HIGH (ref 3.8–10.5)
WBC # FLD AUTO: 20.21 K/UL — HIGH (ref 3.8–10.5)

## 2022-11-15 PROCEDURE — 45020 DRAINAGE OF RECTAL ABSCESS: CPT

## 2022-11-15 PROCEDURE — 74177 CT ABD & PELVIS W/CONTRAST: CPT | Mod: 26,MA

## 2022-11-15 DEVICE — SURGICEL 4 X 8": Type: IMPLANTABLE DEVICE | Status: FUNCTIONAL

## 2022-11-15 RX ORDER — POTASSIUM CHLORIDE 20 MEQ
10 PACKET (EA) ORAL
Refills: 0 | Status: DISCONTINUED | OUTPATIENT
Start: 2022-11-15 | End: 2022-11-15

## 2022-11-15 RX ORDER — HYDROMORPHONE HYDROCHLORIDE 2 MG/ML
0.5 INJECTION INTRAMUSCULAR; INTRAVENOUS; SUBCUTANEOUS ONCE
Refills: 0 | Status: DISCONTINUED | OUTPATIENT
Start: 2022-11-15 | End: 2022-11-15

## 2022-11-15 RX ORDER — MAGNESIUM SULFATE 500 MG/ML
1 VIAL (ML) INJECTION DAILY
Refills: 0 | Status: DISCONTINUED | OUTPATIENT
Start: 2022-11-15 | End: 2022-11-16

## 2022-11-15 RX ORDER — OXYCODONE HYDROCHLORIDE 5 MG/1
5 TABLET ORAL EVERY 4 HOURS
Refills: 0 | Status: DISCONTINUED | OUTPATIENT
Start: 2022-11-15 | End: 2022-11-16

## 2022-11-15 RX ORDER — PIPERACILLIN AND TAZOBACTAM 4; .5 G/20ML; G/20ML
3.38 INJECTION, POWDER, LYOPHILIZED, FOR SOLUTION INTRAVENOUS EVERY 8 HOURS
Refills: 0 | Status: DISCONTINUED | OUTPATIENT
Start: 2022-11-15 | End: 2022-11-16

## 2022-11-15 RX ORDER — PIPERACILLIN AND TAZOBACTAM 4; .5 G/20ML; G/20ML
3.38 INJECTION, POWDER, LYOPHILIZED, FOR SOLUTION INTRAVENOUS ONCE
Refills: 0 | Status: COMPLETED | OUTPATIENT
Start: 2022-11-15 | End: 2022-11-15

## 2022-11-15 RX ORDER — SENNA PLUS 8.6 MG/1
1 TABLET ORAL
Qty: 0 | Refills: 0 | DISCHARGE

## 2022-11-15 RX ORDER — ENOXAPARIN SODIUM 100 MG/ML
40 INJECTION SUBCUTANEOUS EVERY 24 HOURS
Refills: 0 | Status: DISCONTINUED | OUTPATIENT
Start: 2022-11-15 | End: 2022-11-15

## 2022-11-15 RX ORDER — SODIUM CHLORIDE 9 MG/ML
1000 INJECTION, SOLUTION INTRAVENOUS
Refills: 0 | Status: DISCONTINUED | OUTPATIENT
Start: 2022-11-15 | End: 2022-11-16

## 2022-11-15 RX ORDER — POTASSIUM PHOSPHATE, MONOBASIC POTASSIUM PHOSPHATE, DIBASIC 236; 224 MG/ML; MG/ML
30 INJECTION, SOLUTION INTRAVENOUS ONCE
Refills: 0 | Status: DISCONTINUED | OUTPATIENT
Start: 2022-11-15 | End: 2022-11-15

## 2022-11-15 RX ORDER — ACETAMINOPHEN 500 MG
1000 TABLET ORAL EVERY 6 HOURS
Refills: 0 | Status: COMPLETED | OUTPATIENT
Start: 2022-11-15 | End: 2022-11-16

## 2022-11-15 RX ORDER — PSYLLIUM SEED (WITH DEXTROSE)
1 POWDER (GRAM) ORAL DAILY
Refills: 0 | Status: DISCONTINUED | OUTPATIENT
Start: 2022-11-15 | End: 2022-11-16

## 2022-11-15 RX ORDER — ACETAMINOPHEN 500 MG
1000 TABLET ORAL ONCE
Refills: 0 | Status: COMPLETED | OUTPATIENT
Start: 2022-11-15 | End: 2022-11-15

## 2022-11-15 RX ORDER — POTASSIUM CHLORIDE 20 MEQ
10 PACKET (EA) ORAL
Refills: 0 | Status: COMPLETED | OUTPATIENT
Start: 2022-11-15 | End: 2022-11-15

## 2022-11-15 RX ORDER — ONDANSETRON 8 MG/1
4 TABLET, FILM COATED ORAL ONCE
Refills: 0 | Status: COMPLETED | OUTPATIENT
Start: 2022-11-15 | End: 2022-11-15

## 2022-11-15 RX ORDER — OXYCODONE HYDROCHLORIDE 5 MG/1
5 TABLET ORAL ONCE
Refills: 0 | Status: DISCONTINUED | OUTPATIENT
Start: 2022-11-15 | End: 2022-11-15

## 2022-11-15 RX ORDER — ONDANSETRON 8 MG/1
4 TABLET, FILM COATED ORAL ONCE
Refills: 0 | Status: DISCONTINUED | OUTPATIENT
Start: 2022-11-15 | End: 2022-11-15

## 2022-11-15 RX ORDER — OXYCODONE HYDROCHLORIDE 5 MG/1
10 TABLET ORAL EVERY 4 HOURS
Refills: 0 | Status: DISCONTINUED | OUTPATIENT
Start: 2022-11-15 | End: 2022-11-16

## 2022-11-15 RX ORDER — HYDROMORPHONE HYDROCHLORIDE 2 MG/ML
0.5 INJECTION INTRAMUSCULAR; INTRAVENOUS; SUBCUTANEOUS EVERY 4 HOURS
Refills: 0 | Status: DISCONTINUED | OUTPATIENT
Start: 2022-11-15 | End: 2022-11-16

## 2022-11-15 RX ORDER — HYDROMORPHONE HYDROCHLORIDE 2 MG/ML
0.5 INJECTION INTRAMUSCULAR; INTRAVENOUS; SUBCUTANEOUS EVERY 4 HOURS
Refills: 0 | Status: DISCONTINUED | OUTPATIENT
Start: 2022-11-15 | End: 2022-11-15

## 2022-11-15 RX ORDER — PHYTONADIONE (VIT K1) 5 MG
10 TABLET ORAL ONCE
Refills: 0 | Status: COMPLETED | OUTPATIENT
Start: 2022-11-15 | End: 2022-11-15

## 2022-11-15 RX ADMIN — Medication 100 MILLIEQUIVALENT(S): at 09:12

## 2022-11-15 RX ADMIN — Medication 400 MILLIGRAM(S): at 06:00

## 2022-11-15 RX ADMIN — PIPERACILLIN AND TAZOBACTAM 200 GRAM(S): 4; .5 INJECTION, POWDER, LYOPHILIZED, FOR SOLUTION INTRAVENOUS at 04:49

## 2022-11-15 RX ADMIN — PIPERACILLIN AND TAZOBACTAM 25 GRAM(S): 4; .5 INJECTION, POWDER, LYOPHILIZED, FOR SOLUTION INTRAVENOUS at 22:22

## 2022-11-15 RX ADMIN — Medication 1000 MILLIGRAM(S): at 20:03

## 2022-11-15 RX ADMIN — PIPERACILLIN AND TAZOBACTAM 25 GRAM(S): 4; .5 INJECTION, POWDER, LYOPHILIZED, FOR SOLUTION INTRAVENOUS at 14:09

## 2022-11-15 RX ADMIN — ONDANSETRON 4 MILLIGRAM(S): 8 TABLET, FILM COATED ORAL at 06:01

## 2022-11-15 RX ADMIN — SODIUM CHLORIDE 100 MILLILITER(S): 9 INJECTION, SOLUTION INTRAVENOUS at 06:23

## 2022-11-15 RX ADMIN — Medication 100 MILLIEQUIVALENT(S): at 11:57

## 2022-11-15 RX ADMIN — OXYCODONE HYDROCHLORIDE 5 MILLIGRAM(S): 5 TABLET ORAL at 18:00

## 2022-11-15 RX ADMIN — Medication 100 MILLIEQUIVALENT(S): at 10:35

## 2022-11-15 RX ADMIN — Medication 400 MILLIGRAM(S): at 19:33

## 2022-11-15 RX ADMIN — Medication 100 GRAM(S): at 14:13

## 2022-11-15 RX ADMIN — Medication 102 MILLIGRAM(S): at 13:24

## 2022-11-15 RX ADMIN — Medication 400 MILLIGRAM(S): at 12:46

## 2022-11-15 NOTE — BRIEF OPERATIVE NOTE - OPERATION/FINDINGS
incision at posterior midline 12 o'clock abscess, fistula formed, inner open also at posterior midline, seton*2 placed, large mount of pus out;  the abscess cavity bluntly dissected which was tracking down bilateral, incision made on each side at 3 o'clock was made, a seton was placed at each side. Hemostasis achieved in the end.

## 2022-11-15 NOTE — H&P ADULT - HISTORY OF PRESENT ILLNESS
37 year old F w/ PMH ovarian cysts and uterine fibroids, and perirectal abscess drained 10/11/21 presents w/ rectal pain 4 days of rectal pain. She reports significant pain. She reports fevers/chills. She reports she has not been having bms and reports minimal drainage.  No bleeding per rectum. After her initial drainage procedure the pt followed w/ Dr. Domingo in clinic.    In the ER the pt was initially tachycardic which resolved and febrile to 100.9. WBC was 21.7. CT scan showed a perianal abscess measuring 7.1 x 4.3 x 6.2. Pt unable to tolerate exam.

## 2022-11-15 NOTE — PATIENT PROFILE ADULT - FALL HARM RISK - HARM RISK INTERVENTIONS

## 2022-11-15 NOTE — H&P ADULT - NSHPPHYSICALEXAM_GEN_ALL_CORE
No focal neurologic deficits  Sclera nonicteric  NAD, awake and alert  Respirations nonlabored  Abdomen soft, nontender, nondistended  Rectal: extremely tender 4cm from anus bilaterally, visible erythema bilaterally, unable to tolerate further exam

## 2022-11-15 NOTE — BRIEF OPERATIVE NOTE - NSICDXBRIEFPROCEDURE_GEN_ALL_CORE_FT
PROCEDURES:  Incision and drainage of rectal abscess with placement of seton 15-Nov-2022 17:41:55  Mane Gilliam

## 2022-11-15 NOTE — CHART NOTE - NSCHARTNOTEFT_GEN_A_CORE
A Team (General Surgery) Post Operative Check    SUBJECTIVE:  Pt seen and examined, and is resting comfortably in bed. No acute events in PACU. Pain is adequately controlled on current regimen. Pt has no complaints at this time. VSS. Pt denies CP, SOB, Changes in Vision, Headaches, Nausea, and Vomiting. Pt reports she tolerated the procedure well. Perianal dressing reinforced with ABD pad with minor strikethrough.     OBJECTIVE:  Vital Signs Last 24 Hrs  T(C): 36.7 (15 Nov 2022 21:37), Max: 39.2 (15 Nov 2022 06:13)  T(F): 98.1 (15 Nov 2022 21:37), Max: 102.6 (15 Nov 2022 06:13)  HR: 80 (15 Nov 2022 21:37) (80 - 118)  BP: 100/64 (15 Nov 2022 21:37) (94/57 - 117/71)  BP(mean): 61 (15 Nov 2022 18:45) (59 - 73)  RR: 17 (15 Nov 2022 21:37) (12 - 20)  SpO2: 100% (15 Nov 2022 21:37) (95% - 100%)    Parameters below as of 15 Nov 2022 21:37  Patient On (Oxygen Delivery Method): room air        I&O's Detail    15 Nov 2022 07:01  -  16 Nov 2022 01:01  --------------------------------------------------------  IN:    IV PiggyBack: 100 mL    Lactated Ringers: 400 mL    Oral Fluid: 240 mL  Total IN: 740 mL    OUT:    Voided (mL): 400 mL  Total OUT: 400 mL    Total NET: 340 mL        Exam:  GEN: A&Ox3, NAD  HEENT: atraumatic, normocephalic  CV: no JVD  RESP: no increased work of breathing, no use of accessory muscles  GI/ABD: soft, appropriately tender, non-distended, no rebound or guarding, perianal dressing c/d/i with minor strikethrough. seton in place.  : deferred  EXTREMITIES: warm, pink, well-perfused                        9.1    20.21 )-----------( 98       ( 15 Nov 2022 09:15 )             29.0       11-15    140  |  104  |  5<L>  ----------------------------<  129<H>  3.2<L>   |  24  |  0.65    Ca    8.9      15 Nov 2022 09:15  Phos  3.8     11-15  Mg     2.00     11-15    TPro  8.1  /  Alb  4.2  /  TBili  0.6  /  DBili  x   /  AST  28  /  ALT  66<H>  /  AlkPhos  158<H>  11-14      PT/INR - ( 15 Nov 2022 09:15 )   PT: 16.2 sec;   INR: 1.39 ratio         PTT - ( 15 Nov 2022 09:15 )  PTT:26.5 sec    ASSESSMENT:  37F now several hours s/p incision and drainage of rectal abscess with placement of seton. Pt's post operative recovery has been expectant. Pt meets criteria for floor level of care.     PLAN:    - Diet: Regular  - LR @100  - IV Zosyn  - IV Tylenol Q6, 0.5mg Dilaudid PRN  - Activity: OOB/ambulation  - Incentive spirometry  - take down dressing after first bowel movement  - d/c with 1 week Augmentin  - Dispo: Floor        A Team Surgery  pager: [phone] A Team (General Surgery) Post Operative Check    SUBJECTIVE:  Pt seen and examined, and is resting comfortably in bed. No acute events in PACU. Pain is adequately controlled on current regimen. Pt has no complaints at this time. VSS. Pt denies CP, SOB, Changes in Vision, Headaches, Nausea, and Vomiting. Pt reports she tolerated the procedure well. Perianal dressing reinforced with ABD pad with minor strikethrough.     OBJECTIVE:  Vital Signs Last 24 Hrs  T(C): 36.7 (15 Nov 2022 21:37), Max: 39.2 (15 Nov 2022 06:13)  T(F): 98.1 (15 Nov 2022 21:37), Max: 102.6 (15 Nov 2022 06:13)  HR: 80 (15 Nov 2022 21:37) (80 - 118)  BP: 100/64 (15 Nov 2022 21:37) (94/57 - 117/71)  BP(mean): 61 (15 Nov 2022 18:45) (59 - 73)  RR: 17 (15 Nov 2022 21:37) (12 - 20)  SpO2: 100% (15 Nov 2022 21:37) (95% - 100%)    Parameters below as of 15 Nov 2022 21:37  Patient On (Oxygen Delivery Method): room air        I&O's Detail    15 Nov 2022 07:01  -  16 Nov 2022 01:01  --------------------------------------------------------  IN:    IV PiggyBack: 100 mL    Lactated Ringers: 400 mL    Oral Fluid: 240 mL  Total IN: 740 mL    OUT:    Voided (mL): 400 mL  Total OUT: 400 mL    Total NET: 340 mL        Exam:  GEN: A&Ox3, NAD  HEENT: atraumatic, normocephalic  CV: no JVD  RESP: no increased work of breathing, no use of accessory muscles  GI/ABD: soft, appropriately tender, non-distended, no rebound or guarding, perianal dressing c/d/i with minor strikethrough. seton in place.  : deferred  EXTREMITIES: warm, pink, well-perfused                        9.1    20.21 )-----------( 98       ( 15 Nov 2022 09:15 )             29.0       11-15    140  |  104  |  5<L>  ----------------------------<  129<H>  3.2<L>   |  24  |  0.65    Ca    8.9      15 Nov 2022 09:15  Phos  3.8     11-15  Mg     2.00     11-15    TPro  8.1  /  Alb  4.2  /  TBili  0.6  /  DBili  x   /  AST  28  /  ALT  66<H>  /  AlkPhos  158<H>  11-14      PT/INR - ( 15 Nov 2022 09:15 )   PT: 16.2 sec;   INR: 1.39 ratio         PTT - ( 15 Nov 2022 09:15 )  PTT:26.5 sec    ASSESSMENT:  37F now several hours s/p incision and drainage of rectal abscess with placement of seton. Pt's post operative recovery has been expectant. Pt meets criteria for floor level of care.     PLAN:    - Diet: Regular  - LR @100  - IV Zosyn  - IV Tylenol Q6, Pain Management PRN for breakthrough pain  - Activity: OOB/ambulation  - Incentive spirometry  - take down dressing after first bowel movement  - d/c with 1 week Augmentin  - Dispo: Floor        A Team Surgery  pager: [phone]

## 2022-11-15 NOTE — ED ADULT NURSE REASSESSMENT NOTE - NS ED NURSE REASSESS COMMENT FT1
break coverage: pt resting comfortably, VSS, pending sx consult, no pending interventions at this time
pt A&ox4, awake and alert. report given to DENA Manley. breathing is spontaneous and unlabored. pt report rectal pain. IV medication given as per ordered. will continue to monitor.
pt A&ox4, awake and alert. denies chest pain and SOB. no complaints of pain. denies headache, dizziness ,lightheadedness, radiating chest pain. breathing is spontaneous and unlabored. NSR on telemetry. continuos cardiac and pulse ox monitoring in place. bed in lowest position, call bell within reach, siderails up x2. will continue to monitor.

## 2022-11-15 NOTE — H&P ADULT - NSHPLABSRESULTS_GEN_ALL_CORE
c< from: CT Abdomen and Pelvis w/ IV Cont (11.15.22 @ 01:16) >    FINDINGS:  LOWER CHEST: Mild bibasilar dependent atelectasis.    LIVER: Calcified granuloma.  BILE DUCTS: Normal caliber.  GALLBLADDER: Within normal limits.  SPLEEN: Within normal limits.  PANCREAS: Within normal limits.  ADRENALS: Within normal limits.  KIDNEYS/URETERS: Kidneysenhance symmetrically without hydronephrosis.    BLADDER: Distended.  REPRODUCTIVE ORGANS: Enlarged fibroid uterus, largest left-sided   intramural uterine fibroid measuring up to 4.9 cm. Bilateral adnexal   cysts, largest cyst in the right adnexa measures up to 4.2 cm and largest   cyst in the left adnexa measures up to 5.3 cm. Moderate volume of   slightly complex pelvic free fluid in the cul-de-sac.    BOWEL: No bowel obstruction or overt bowel wall thickening. Appendix is   normal.  PERITONEUM: Moderate volume of slightly complex pelvic free fluid in the   cul-de-sac. No pneumoperitoneum or loculated collection. No mesenteric   lymphadenopathy.  VESSELS: Within normal limits.  RETROPERITONEUM/LYMPH NODES: No lymphadenopathy.  ABDOMINAL WALL: Enhancing multilobular posterior perianal   collection/abscess which measures up to 7.1 x 4.3 x 6.2 cm.  BONES: Mild degenerative changes of the spine. Bilateral spondylolysis at   L5 with grade 1 spondylolisthesis.    IMPRESSION:  Enhancing multilobular posterior perirenal collection/abscess measuring   up to 7.1 x 4.3 x 6.2 cm.    Enlarged fibroid uterus, largest left-sided intramural uterine fibroid   measuring up to 4.9 cm. Bilateral adnexal cysts, largest measuring up to   4.2 cm on the right and 5.3 cm on the left. Moderate volume of slightly   complex pelvic free fluid.    Distended urinary bladder.    < end of copied text >

## 2022-11-16 ENCOUNTER — TRANSCRIPTION ENCOUNTER (OUTPATIENT)
Age: 37
End: 2022-11-16

## 2022-11-16 VITALS
SYSTOLIC BLOOD PRESSURE: 103 MMHG | TEMPERATURE: 98 F | HEART RATE: 59 BPM | RESPIRATION RATE: 18 BRPM | DIASTOLIC BLOOD PRESSURE: 60 MMHG | OXYGEN SATURATION: 100 %

## 2022-11-16 LAB
ANION GAP SERPL CALC-SCNC: 12 MMOL/L — SIGNIFICANT CHANGE UP (ref 7–14)
BUN SERPL-MCNC: 7 MG/DL — SIGNIFICANT CHANGE UP (ref 7–23)
CALCIUM SERPL-MCNC: 9.1 MG/DL — SIGNIFICANT CHANGE UP (ref 8.4–10.5)
CHLORIDE SERPL-SCNC: 106 MMOL/L — SIGNIFICANT CHANGE UP (ref 98–107)
CO2 SERPL-SCNC: 24 MMOL/L — SIGNIFICANT CHANGE UP (ref 22–31)
CREAT SERPL-MCNC: 0.62 MG/DL — SIGNIFICANT CHANGE UP (ref 0.5–1.3)
CULTURE RESULTS: SIGNIFICANT CHANGE UP
EGFR: 119 ML/MIN/1.73M2 — SIGNIFICANT CHANGE UP
GLUCOSE SERPL-MCNC: 130 MG/DL — HIGH (ref 70–99)
HCT VFR BLD CALC: 27.9 % — LOW (ref 34.5–45)
HGB BLD-MCNC: 8.8 G/DL — LOW (ref 11.5–15.5)
MAGNESIUM SERPL-MCNC: 2.3 MG/DL — SIGNIFICANT CHANGE UP (ref 1.6–2.6)
MCHC RBC-ENTMCNC: 27.8 PG — SIGNIFICANT CHANGE UP (ref 27–34)
MCHC RBC-ENTMCNC: 31.5 GM/DL — LOW (ref 32–36)
MCV RBC AUTO: 88 FL — SIGNIFICANT CHANGE UP (ref 80–100)
NRBC # BLD: 0 /100 WBCS — SIGNIFICANT CHANGE UP (ref 0–0)
NRBC # FLD: 0 K/UL — SIGNIFICANT CHANGE UP (ref 0–0)
PHOSPHATE SERPL-MCNC: 2.6 MG/DL — SIGNIFICANT CHANGE UP (ref 2.5–4.5)
PLATELET # BLD AUTO: 79 K/UL — LOW (ref 150–400)
POTASSIUM SERPL-MCNC: 3.8 MMOL/L — SIGNIFICANT CHANGE UP (ref 3.5–5.3)
POTASSIUM SERPL-SCNC: 3.8 MMOL/L — SIGNIFICANT CHANGE UP (ref 3.5–5.3)
RBC # BLD: 3.17 M/UL — LOW (ref 3.8–5.2)
RBC # FLD: 14 % — SIGNIFICANT CHANGE UP (ref 10.3–14.5)
SODIUM SERPL-SCNC: 142 MMOL/L — SIGNIFICANT CHANGE UP (ref 135–145)
SPECIMEN SOURCE: SIGNIFICANT CHANGE UP
WBC # BLD: 23.16 K/UL — HIGH (ref 3.8–10.5)
WBC # FLD AUTO: 23.16 K/UL — HIGH (ref 3.8–10.5)

## 2022-11-16 RX ORDER — OXYCODONE HYDROCHLORIDE 5 MG/1
1 TABLET ORAL
Qty: 20 | Refills: 0
Start: 2022-11-16

## 2022-11-16 RX ORDER — HYDROMORPHONE HYDROCHLORIDE 2 MG/ML
0.5 INJECTION INTRAMUSCULAR; INTRAVENOUS; SUBCUTANEOUS ONCE
Refills: 0 | Status: DISCONTINUED | OUTPATIENT
Start: 2022-11-16 | End: 2022-11-16

## 2022-11-16 RX ORDER — SENNA PLUS 8.6 MG/1
2 TABLET ORAL
Qty: 60 | Refills: 0
Start: 2022-11-16

## 2022-11-16 RX ORDER — POLYETHYLENE GLYCOL 3350 17 G/17G
17 POWDER, FOR SOLUTION ORAL
Qty: 119 | Refills: 0
Start: 2022-11-16

## 2022-11-16 RX ORDER — SODIUM CHLORIDE 9 MG/ML
1000 INJECTION, SOLUTION INTRAVENOUS
Refills: 0 | Status: DISCONTINUED | OUTPATIENT
Start: 2022-11-16 | End: 2022-11-16

## 2022-11-16 RX ADMIN — Medication 400 MILLIGRAM(S): at 00:12

## 2022-11-16 RX ADMIN — Medication 400 MILLIGRAM(S): at 06:24

## 2022-11-16 RX ADMIN — PIPERACILLIN AND TAZOBACTAM 25 GRAM(S): 4; .5 INJECTION, POWDER, LYOPHILIZED, FOR SOLUTION INTRAVENOUS at 06:24

## 2022-11-16 RX ADMIN — Medication 1 PACKET(S): at 11:40

## 2022-11-16 RX ADMIN — PIPERACILLIN AND TAZOBACTAM 25 GRAM(S): 4; .5 INJECTION, POWDER, LYOPHILIZED, FOR SOLUTION INTRAVENOUS at 11:41

## 2022-11-16 RX ADMIN — Medication 100 GRAM(S): at 11:41

## 2022-11-16 RX ADMIN — HYDROMORPHONE HYDROCHLORIDE 0.5 MILLIGRAM(S): 2 INJECTION INTRAMUSCULAR; INTRAVENOUS; SUBCUTANEOUS at 09:44

## 2022-11-16 NOTE — PROGRESS NOTE ADULT - SUBJECTIVE AND OBJECTIVE BOX
SURGERY DAILY PROGRESS NOTE    --------------- OVERNIGHT/INTERVAL EVENTS ---------------  - Drew to 40-50; asymptomatic   - No acute events overnight     --------------- SUBJECTIVE ---------------  - Patient describes no acute issues or concerns at this time  - -/- BM +void. Denies CP, SOB, n/v, abdominal pain.   - Patient seen and examined at bedside with surgical team    --------------- OBJECTIVE ---------------    -----VITALS-----  T(C): 36.4, Max: 37.4 (11-15)  HR: 79 (66 - 118)  BP: 105/70 (90/56 - 111/60)  RR: 16 (12 - 20)  SpO2: 99% (95% - 100%) room air        -----PHYSICAL EXAM-----  GENERAL: NAD, lying in bed   NEURO: AOx3, awake alert appropriate  HEENT: NCAT, trachea midline  PULM: Respirations non-labored  ABD: Soft, non-tender, non-distended, no peritonitis/rebound tenderness  Rectum: packing, 4x seton secured in place; SSF discharge, gauze, abd pad  EXT: Warm, well perfused     -----INs & OUTs-----    11-15  -  11-16  --------------------------------------------------------  IN:    IV PiggyBack: 200 mL    Lactated Ringers: 1200 mL    Oral Fluid: 360 mL  Total IN: 1760 mL    OUT:    Voided (mL): 800 mL  Total OUT: 800 mL       -----MEDICATIONS-----  STANDING  acetaminophen   IVPB .. 1000 milliGRAM(s) IV Intermittent every 6 hours  lactated ringers. 1000 milliLiter(s) (100 mL/Hr) IV Continuous <Continuous>  magnesium sulfate  IVPB 1 Gram(s) IV Intermittent daily  piperacillin/tazobactam IVPB.. 3.375 Gram(s) IV Intermittent every 8 hours  psyllium Powder 1 Packet(s) Oral daily    PRN  HYDROmorphone  Injectable 0.5 milliGRAM(s) IV Push every 4 hours PRN breakthrough  oxyCODONE    IR 5 milliGRAM(s) Oral every 4 hours PRN Moderate Pain (4 - 6)  oxyCODONE    IR 10 milliGRAM(s) Oral every 4 hours PRN Severe Pain (7 - 10)      -----LABS-----  140  |  104  |  5<L>  ----------------------------<  129<H>    (11-15)  3.2<L>   |  24  |  0.65            Ca    8.9      11-15  Mg    2.00  Phos  3.8          PT: 16.2 sec     11-15  aPTT: 26.5 sec   INR: 1.39 ratio

## 2022-11-16 NOTE — DISCHARGE NOTE PROVIDER - CARE PROVIDER_API CALL
Alirio Reyes)  ColonRectal Surgery; Surgery  [address] [facility], [address]  [place] [address]  Phone: [phone]  Fax: [phone]  Follow Up Time: 2 weeks

## 2022-11-16 NOTE — DISCHARGE NOTE PROVIDER - HOSPITAL COURSE
37 year old F w/ PMH ovarian cysts and uterine fibroids, and perirectal abscess drained 10/11/21 presents w/ rectal pain 4 days of rectal pain. She reports significant pain. She reports fevers/chills. She reports she has not been having bms and reports minimal drainage.  No bleeding per rectum. After her initial drainage procedure the pt followed w/ Dr. Domingo in clinic.    In the ER the pt was initially tachycardic which resolved and febrile to 100.9. WBC was 21.7. CT scan showed a perianal abscess measuring 7.1 x 4.3 x 6.2. Pt unable to tolerate exam.     Taken to the OR later that night, s/p perirectal abscess I&D with seton placement x4. At time of discharge pt was stable, ambulating, voiding, and pain well controlled with medication. Tolerating regular diet and passing gas.

## 2022-11-16 NOTE — DISCHARGE NOTE PROVIDER - NSDCCPTREATMENT_GEN_ALL_CORE_FT
PRINCIPAL PROCEDURE  Procedure: Incision and drainage of rectal abscess with placement of seton  Findings and Treatment:

## 2022-11-16 NOTE — DISCHARGE NOTE PROVIDER - NSDCMRMEDTOKEN_GEN_ALL_CORE_FT
amoxicillin-clavulanate 875 mg-125 mg oral tablet: 1 tab(s) orally every 12 hours   oxyCODONE 5 mg oral capsule: 1 cap(s) orally every 4 hours, As Needed -for severe pain MDD:6

## 2022-11-16 NOTE — DISCHARGE NOTE NURSING/CASE MANAGEMENT/SOCIAL WORK - PATIENT PORTAL LINK FT
You can access the FollowMyHealth Patient Portal offered by [facility] by registering at the following website: [email or web address]. By joining [employer]’s FollowMyHealth portal, you will also be able to view your health information using other applications (apps) compatible with our system.

## 2022-11-16 NOTE — PROGRESS NOTE ADULT - ASSESSMENT
ASSESSMENT:  37F s/p incision and drainage of rectal abscess with placement of seton.    PLAN:    - Diet: Regular  - IV Zosyn  - IV Tylenol Q6, Pain Management PRN for breakthrough pain  - Activity: OOB/ambulation  - Incentive spirometry  - take down dressing after first bowel movement  - d/c with 1 week Augmentin  - r/m packing before dishcarge      A Team Surgery  pager: [phone].

## 2022-11-16 NOTE — DISCHARGE NOTE PROVIDER - NSDCFUADDINST_GEN_ALL_CORE_FT
Have Sitz baths three times a day  Take Metamucil daily to soften stool  Take Augmentin, an antibiotic, as prescribed

## 2022-11-17 LAB
ANION GAP SERPL CALC-SCNC: 12 MMOL/L — SIGNIFICANT CHANGE UP (ref 7–14)
BUN SERPL-MCNC: 10 MG/DL — SIGNIFICANT CHANGE UP (ref 7–23)
CALCIUM SERPL-MCNC: 8.4 MG/DL — SIGNIFICANT CHANGE UP (ref 8.4–10.5)
CHLORIDE SERPL-SCNC: 99 MMOL/L — SIGNIFICANT CHANGE UP (ref 98–107)
CO2 SERPL-SCNC: 26 MMOL/L — SIGNIFICANT CHANGE UP (ref 22–31)
CREAT SERPL-MCNC: 0.54 MG/DL — SIGNIFICANT CHANGE UP (ref 0.5–1.3)
EGFR: 123 ML/MIN/1.73M2 — SIGNIFICANT CHANGE UP
GLUCOSE SERPL-MCNC: 96 MG/DL — SIGNIFICANT CHANGE UP (ref 70–99)
HCT VFR BLD CALC: 33.7 % — LOW (ref 34.5–45)
HGB BLD-MCNC: 10.5 G/DL — LOW (ref 11.5–15.5)
MAGNESIUM SERPL-MCNC: 1.6 MG/DL — SIGNIFICANT CHANGE UP (ref 1.6–2.6)
MCHC RBC-ENTMCNC: 26.3 PG — LOW (ref 27–34)
MCHC RBC-ENTMCNC: 31.2 GM/DL — LOW (ref 32–36)
MCV RBC AUTO: 84.5 FL — SIGNIFICANT CHANGE UP (ref 80–100)
NRBC # BLD: 0 /100 WBCS — SIGNIFICANT CHANGE UP (ref 0–0)
NRBC # FLD: 0 K/UL — SIGNIFICANT CHANGE UP (ref 0–0)
PHOSPHATE SERPL-MCNC: 4.1 MG/DL — SIGNIFICANT CHANGE UP (ref 2.5–4.5)
PLATELET # BLD AUTO: 210 K/UL — SIGNIFICANT CHANGE UP (ref 150–400)
POTASSIUM SERPL-MCNC: 3.9 MMOL/L — SIGNIFICANT CHANGE UP (ref 3.5–5.3)
POTASSIUM SERPL-SCNC: 3.9 MMOL/L — SIGNIFICANT CHANGE UP (ref 3.5–5.3)
RBC # BLD: 3.99 M/UL — SIGNIFICANT CHANGE UP (ref 3.8–5.2)
RBC # FLD: 13.9 % — SIGNIFICANT CHANGE UP (ref 10.3–14.5)
SODIUM SERPL-SCNC: 137 MMOL/L — SIGNIFICANT CHANGE UP (ref 135–145)
WBC # BLD: 10.76 K/UL — HIGH (ref 3.8–10.5)
WBC # FLD AUTO: 10.76 K/UL — HIGH (ref 3.8–10.5)

## 2022-11-30 ENCOUNTER — APPOINTMENT (OUTPATIENT)
Dept: COLORECTAL SURGERY | Facility: CLINIC | Age: 37
End: 2022-11-30

## 2022-11-30 PROCEDURE — 99024 POSTOP FOLLOW-UP VISIT: CPT

## 2022-11-30 RX ORDER — OXYCODONE 5 MG/1
5 TABLET ORAL
Qty: 20 | Refills: 0 | Status: ACTIVE | COMMUNITY
Start: 2022-11-16

## 2022-11-30 RX ORDER — ERGOCALCIFEROL 1.25 MG/1
1.25 MG CAPSULE, LIQUID FILLED ORAL
Qty: 12 | Refills: 0 | Status: ACTIVE | COMMUNITY
Start: 2022-09-28

## 2022-11-30 RX ORDER — AMOXICILLIN AND CLAVULANATE POTASSIUM 875; 125 MG/1; MG/1
875-125 TABLET, COATED ORAL
Qty: 14 | Refills: 0 | Status: ACTIVE | COMMUNITY
Start: 2022-11-16

## 2022-11-30 RX ORDER — IBUPROFEN 600 MG/1
600 TABLET, FILM COATED ORAL
Qty: 60 | Refills: 0 | Status: ACTIVE | COMMUNITY
Start: 2022-08-04

## 2022-11-30 NOTE — HISTORY OF PRESENT ILLNESS
[FreeTextEntry1] : 37-year-old female status post Juanita procedure 4 horseshoe abscess recovering well. He denies fever.

## 2022-11-30 NOTE — ASSESSMENT
[FreeTextEntry1] : 37-year-old female status post incision and drainage and multiple seton placement for complicated perirectal abscess recovering well

## 2022-12-14 ENCOUNTER — APPOINTMENT (OUTPATIENT)
Dept: COLORECTAL SURGERY | Facility: CLINIC | Age: 37
End: 2022-12-14
Payer: COMMERCIAL

## 2022-12-14 PROCEDURE — 46030 REMOVAL ANAL SETON OTH MRK: CPT | Mod: 58

## 2022-12-14 PROCEDURE — 99024 POSTOP FOLLOW-UP VISIT: CPT

## 2022-12-14 NOTE — ASSESSMENT
[FreeTextEntry1] : 37-year-old female status post modified Juanita procedure for horseshoe abscess recovering well.

## 2022-12-14 NOTE — HISTORY OF PRESENT ILLNESS
[FreeTextEntry1] : 37-year-old female status post horseshoe abscess with multiple setons placed here for followup.

## 2022-12-28 ENCOUNTER — APPOINTMENT (OUTPATIENT)
Dept: COLORECTAL SURGERY | Facility: CLINIC | Age: 37
End: 2022-12-28

## 2023-01-04 ENCOUNTER — APPOINTMENT (OUTPATIENT)
Dept: COLORECTAL SURGERY | Facility: CLINIC | Age: 38
End: 2023-01-04
Payer: COMMERCIAL

## 2023-01-04 DIAGNOSIS — Z01.818 ENCOUNTER FOR OTHER PREPROCEDURAL EXAMINATION: ICD-10-CM

## 2023-01-04 PROCEDURE — 46030 REMOVAL ANAL SETON OTH MRK: CPT | Mod: 58

## 2023-01-04 PROCEDURE — 99024 POSTOP FOLLOW-UP VISIT: CPT

## 2023-01-04 NOTE — ASSESSMENT
[FreeTextEntry1] : 37-year-old female status post incision and drainage and multiple seton placement for horseshoe abscess. She is making steady progress. The left lateral seton was removed today. The posterior seton drain in place.

## 2023-01-18 ENCOUNTER — APPOINTMENT (OUTPATIENT)
Dept: COLORECTAL SURGERY | Facility: CLINIC | Age: 38
End: 2023-01-18
Payer: COMMERCIAL

## 2023-01-18 PROCEDURE — 99024 POSTOP FOLLOW-UP VISIT: CPT

## 2023-01-18 PROCEDURE — 46030 REMOVAL ANAL SETON OTH MRK: CPT | Mod: 58

## 2023-01-18 NOTE — ASSESSMENT
[FreeTextEntry1] : A [age]-year-old female status post Juanita procedure for horseshoe abscess. One posterior midline seton removed. Cutting silk seton still in place. It is now time to schedule completion fistulotomy. Risks of permanent incontinence explained to patient as well as high risk of recurrence.

## 2023-01-23 ENCOUNTER — OUTPATIENT (OUTPATIENT)
Dept: OUTPATIENT SERVICES | Facility: HOSPITAL | Age: 38
LOS: 1 days | End: 2023-01-23
Payer: COMMERCIAL

## 2023-01-23 VITALS
HEART RATE: 67 BPM | SYSTOLIC BLOOD PRESSURE: 113 MMHG | WEIGHT: 141.98 LBS | DIASTOLIC BLOOD PRESSURE: 78 MMHG | TEMPERATURE: 98 F | OXYGEN SATURATION: 98 % | HEIGHT: 61 IN | RESPIRATION RATE: 14 BRPM

## 2023-01-23 DIAGNOSIS — Z11.52 ENCOUNTER FOR SCREENING FOR COVID-19: ICD-10-CM

## 2023-01-23 DIAGNOSIS — Z98.890 OTHER SPECIFIED POSTPROCEDURAL STATES: Chronic | ICD-10-CM

## 2023-01-23 DIAGNOSIS — Z01.818 ENCOUNTER FOR OTHER PREPROCEDURAL EXAMINATION: ICD-10-CM

## 2023-01-23 DIAGNOSIS — K60.3 ANAL FISTULA: ICD-10-CM

## 2023-01-23 DIAGNOSIS — K60.3 ANAL FISTULA: Chronic | ICD-10-CM

## 2023-01-23 LAB
ANION GAP SERPL CALC-SCNC: 11 MMOL/L — SIGNIFICANT CHANGE UP (ref 5–17)
BUN SERPL-MCNC: 11 MG/DL — SIGNIFICANT CHANGE UP (ref 7–23)
CALCIUM SERPL-MCNC: 9.4 MG/DL — SIGNIFICANT CHANGE UP (ref 8.4–10.5)
CHLORIDE SERPL-SCNC: 105 MMOL/L — SIGNIFICANT CHANGE UP (ref 96–108)
CO2 SERPL-SCNC: 22 MMOL/L — SIGNIFICANT CHANGE UP (ref 22–31)
CREAT SERPL-MCNC: 0.72 MG/DL — SIGNIFICANT CHANGE UP (ref 0.5–1.3)
EGFR: 111 ML/MIN/1.73M2 — SIGNIFICANT CHANGE UP
GLUCOSE SERPL-MCNC: 101 MG/DL — HIGH (ref 70–99)
HCT VFR BLD CALC: 33.5 % — LOW (ref 34.5–45)
HGB BLD-MCNC: 10.5 G/DL — LOW (ref 11.5–15.5)
MCHC RBC-ENTMCNC: 26.5 PG — LOW (ref 27–34)
MCHC RBC-ENTMCNC: 31.3 GM/DL — LOW (ref 32–36)
MCV RBC AUTO: 84.6 FL — SIGNIFICANT CHANGE UP (ref 80–100)
NRBC # BLD: 0 /100 WBCS — SIGNIFICANT CHANGE UP (ref 0–0)
PLATELET # BLD AUTO: SIGNIFICANT CHANGE UP K/UL (ref 150–400)
POTASSIUM SERPL-MCNC: 3.7 MMOL/L — SIGNIFICANT CHANGE UP (ref 3.5–5.3)
POTASSIUM SERPL-SCNC: 3.7 MMOL/L — SIGNIFICANT CHANGE UP (ref 3.5–5.3)
RBC # BLD: 3.96 M/UL — SIGNIFICANT CHANGE UP (ref 3.8–5.2)
RBC # FLD: 14.9 % — HIGH (ref 10.3–14.5)
SARS-COV-2 RNA SPEC QL NAA+PROBE: SIGNIFICANT CHANGE UP
SODIUM SERPL-SCNC: 138 MMOL/L — SIGNIFICANT CHANGE UP (ref 135–145)
WBC # BLD: 9.24 K/UL — SIGNIFICANT CHANGE UP (ref 3.8–10.5)
WBC # FLD AUTO: 9.24 K/UL — SIGNIFICANT CHANGE UP (ref 3.8–10.5)

## 2023-01-23 PROCEDURE — C9803: CPT

## 2023-01-23 PROCEDURE — U0005: CPT

## 2023-01-23 PROCEDURE — U0003: CPT

## 2023-01-23 RX ORDER — SODIUM CHLORIDE 9 MG/ML
1000 INJECTION, SOLUTION INTRAVENOUS
Refills: 0 | Status: DISCONTINUED | OUTPATIENT
Start: 2023-01-27 | End: 2023-02-10

## 2023-01-23 RX ORDER — LIDOCAINE HCL 20 MG/ML
0.2 VIAL (ML) INJECTION ONCE
Refills: 0 | Status: DISCONTINUED | OUTPATIENT
Start: 2023-01-27 | End: 2023-02-10

## 2023-01-23 RX ORDER — SODIUM CHLORIDE 9 MG/ML
3 INJECTION INTRAMUSCULAR; INTRAVENOUS; SUBCUTANEOUS EVERY 8 HOURS
Refills: 0 | Status: DISCONTINUED | OUTPATIENT
Start: 2023-01-27 | End: 2023-02-10

## 2023-01-23 NOTE — H&P PST ADULT - NSANTHOSAYNRD_GEN_A_CORE
No. TOYB screening performed.  STOP BANG Legend: 0-2 = LOW Risk; 3-4 = INTERMEDIATE Risk; 5-8 = HIGH Risk

## 2023-01-23 NOTE — H&P PST ADULT - MUSCULOSKELETAL
ROM intact/no joint swelling/no joint erythema/no joint warmth/normal gait/strength 5/5 bilateral upper extremities/strength 5/5 bilateral lower extremities negative

## 2023-01-23 NOTE — H&P PST ADULT - PROBLEM SELECTOR PLAN 1
Completion Anal Fistulotomy   -cbc, bmp done at [facility]  -preop instructions provided  -ucg on admit

## 2023-01-23 NOTE — H&P PST ADULT - HISTORY OF PRESENT ILLNESS
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  She denies fever, chills, nausea/vomiting abdominal pain, or changes in bowel habits.    preop covid swab 1/23 @ [facility]

## 2023-01-24 LAB — CLOSURE TME COLL+EPINEP BLD: 227 K/UL — SIGNIFICANT CHANGE UP (ref 150–400)

## 2023-01-24 PROCEDURE — 80048 BASIC METABOLIC PNL TOTAL CA: CPT

## 2023-01-24 PROCEDURE — 85049 AUTOMATED PLATELET COUNT: CPT

## 2023-01-24 PROCEDURE — G0463: CPT

## 2023-01-24 PROCEDURE — 85027 COMPLETE CBC AUTOMATED: CPT

## 2023-01-26 ENCOUNTER — TRANSCRIPTION ENCOUNTER (OUTPATIENT)
Age: 38
End: 2023-01-26

## 2023-01-27 ENCOUNTER — OUTPATIENT (OUTPATIENT)
Dept: OUTPATIENT SERVICES | Facility: HOSPITAL | Age: 38
LOS: 1 days | End: 2023-01-27
Payer: COMMERCIAL

## 2023-01-27 ENCOUNTER — APPOINTMENT (OUTPATIENT)
Dept: COLORECTAL SURGERY | Facility: HOSPITAL | Age: 38
End: 2023-01-27
Payer: COMMERCIAL

## 2023-01-27 ENCOUNTER — TRANSCRIPTION ENCOUNTER (OUTPATIENT)
Age: 38
End: 2023-01-27

## 2023-01-27 VITALS
OXYGEN SATURATION: 98 % | DIASTOLIC BLOOD PRESSURE: 70 MMHG | SYSTOLIC BLOOD PRESSURE: 110 MMHG | HEART RATE: 77 BPM | RESPIRATION RATE: 16 BRPM | TEMPERATURE: 98 F

## 2023-01-27 VITALS
WEIGHT: 141.98 LBS | OXYGEN SATURATION: 100 % | DIASTOLIC BLOOD PRESSURE: 73 MMHG | RESPIRATION RATE: 14 BRPM | HEIGHT: 61 IN | HEART RATE: 89 BPM | SYSTOLIC BLOOD PRESSURE: 111 MMHG | TEMPERATURE: 98 F

## 2023-01-27 DIAGNOSIS — K60.3 ANAL FISTULA: Chronic | ICD-10-CM

## 2023-01-27 DIAGNOSIS — Z98.890 OTHER SPECIFIED POSTPROCEDURAL STATES: Chronic | ICD-10-CM

## 2023-01-27 DIAGNOSIS — K60.3 ANAL FISTULA: ICD-10-CM

## 2023-01-27 PROCEDURE — 46285 REMOVE ANAL FIST 2 STAGE: CPT

## 2023-01-27 PROCEDURE — 46285 REMOVE ANAL FIST 2 STAGE: CPT | Mod: 79

## 2023-01-27 PROCEDURE — C1889: CPT

## 2023-01-27 DEVICE — SURGICEL FIBRILLAR 2 X 4": Type: IMPLANTABLE DEVICE | Status: FUNCTIONAL

## 2023-01-27 RX ORDER — IBUPROFEN 200 MG
1 TABLET ORAL
Qty: 0 | Refills: 0 | DISCHARGE

## 2023-01-27 RX ORDER — OXYCODONE HYDROCHLORIDE 5 MG/1
1 TABLET ORAL
Qty: 10 | Refills: 0
Start: 2023-01-27

## 2023-01-27 RX ADMIN — SODIUM CHLORIDE 100 MILLILITER(S): 9 INJECTION, SOLUTION INTRAVENOUS at 09:55

## 2023-01-27 NOTE — ASU DISCHARGE PLAN (ADULT/PEDIATRIC) - NS MD DC FALL RISK RISK
For information on Fall & Injury Prevention, visit: [email or web address] OR  [email or web address] OR  https://www.cdc.gov/steadi/patient.html

## 2023-01-27 NOTE — ASU PREOP CHECKLIST - RESPIRATORY RATE (BREATHS/MIN)
Left message to call back for: Pt to call back on mainline to schedule an appointement    Information to relay to patient:  LVM that Rx was refilled for 30 capsules but Pt is due for a follow-up. Pt must schedule a F/U appointment in order to receive any further refills. (see note below)     14

## 2023-01-27 NOTE — BRIEF OPERATIVE NOTE - NSICDXBRIEFPROCEDURE_GEN_ALL_CORE_FT
PROCEDURES:  Removal, anal seton 27-Jan-2023 11:46:40  Torin Washington  Perianal fistulotomy 27-Jan-2023 11:46:55  Torin Washington

## 2023-01-27 NOTE — PRE-ANESTHESIA EVALUATION ADULT - NSANTHADDINFOFT_GEN_ALL_CORE
easy mask ventilation and uneventful intubation in anesthesia record from [facility] easy mask ventilation and uneventful intubation in anesthesia record from [facility]  the patient's plt range 50-75, discussed with surgeon

## 2023-01-27 NOTE — ASU DISCHARGE PLAN (ADULT/PEDIATRIC) - NURSING INSTRUCTIONS
OK to take Tylenol/Acetaminophen at/after 5:00PM_ for pain and every 6 hours after as needed. OK to take Motrin/Ibuprofen  for pain and every 6 hours after as needed.

## 2023-01-27 NOTE — BRIEF OPERATIVE NOTE - OPERATION/FINDINGS
Pt draped and prepped in standard fashion. Seton identified, probe passed along fistula. Seton was then cut, fistula opened with electrocautery. Hemostasis achieved.

## 2023-01-27 NOTE — ASU DISCHARGE PLAN (ADULT/PEDIATRIC) - CARE PROVIDER_API CALL
Alirio Reyes)  ColonRectal Surgery; Surgery  [address] [facility] [facility], [address]  [place] [address]  Phone: [phone]  Fax: [phone]  Follow Up Time: 2 weeks

## 2023-01-27 NOTE — ASU DISCHARGE PLAN (ADULT/PEDIATRIC) - CALL YOUR DOCTOR IF YOU HAVE ANY OF THE FOLLOWING:
Procedure:  SHOULDER ARTHROSCOY WITH ANTERIOR REPAIR LABRUM (Left Shoulder)    Relevant Problems   ANESTHESIA (within normal limits)      CARDIO (within normal limits)      ENDO (within normal limits)      PULMONARY (within normal limits)        Physical Exam    Airway    Mallampati score: I  TM Distance: >3 FB  Neck ROM: full     Dental   No notable dental hx     Cardiovascular  Rhythm: regular, Rate: normal,     Pulmonary  Breath sounds clear to auscultation,     Other Findings        Anesthesia Plan  ASA Score- 1     Anesthesia Type- regional with ASA Monitors  Additional Monitors:   Airway Plan:           Plan Factors-Exercise tolerance (METS): >4 METS  Chart reviewed  Existing labs reviewed  Patient summary reviewed  Patient is not a current smoker  Induction- intravenous  Postoperative Plan- Plan for postoperative opioid use  Planned trial extubation    Informed Consent- Anesthetic plan and risks discussed with patient and father  I personally reviewed this patient with the CRNA  Discussed and agreed on the Anesthesia Plan with the CRNA  Jordan Driscoll
please see printed instructions./Bleeding that does not stop/Pain not relieved by Medications/Nausea and vomiting that does not stop/Unable to urinate

## 2023-01-30 PROBLEM — K60.3 ANAL FISTULA: Chronic | Status: ACTIVE | Noted: 2023-01-23

## 2023-02-08 ENCOUNTER — APPOINTMENT (OUTPATIENT)
Dept: COLORECTAL SURGERY | Facility: CLINIC | Age: 38
End: 2023-02-08
Payer: COMMERCIAL

## 2023-02-08 PROCEDURE — 99024 POSTOP FOLLOW-UP VISIT: CPT

## 2023-02-08 NOTE — HISTORY OF PRESENT ILLNESS
[FreeTextEntry1] : 38 F s/p completion fistulotomy healing well. \par She has good control of her stool and gas.

## 2023-04-05 ENCOUNTER — APPOINTMENT (OUTPATIENT)
Dept: COLORECTAL SURGERY | Facility: CLINIC | Age: 38
End: 2023-04-05

## 2023-05-03 ENCOUNTER — APPOINTMENT (OUTPATIENT)
Dept: COLORECTAL SURGERY | Facility: CLINIC | Age: 38
End: 2023-05-03
Payer: COMMERCIAL

## 2023-05-03 DIAGNOSIS — K61.0 ANAL ABSCESS: ICD-10-CM

## 2023-05-03 DIAGNOSIS — K61.31 HORSESHOE ABSCESS: ICD-10-CM

## 2023-05-03 DIAGNOSIS — K60.3 ANAL FISTULA: ICD-10-CM

## 2023-05-03 PROCEDURE — 99212 OFFICE O/P EST SF 10 MIN: CPT

## 2023-05-03 NOTE — HISTORY OF PRESENT ILLNESS
[FreeTextEntry1] : 38 year-old female status post fistulotomy and incision and drainage of perianal abscess recovering well. She has no complaints. Her only issue is occasional itching. She does drink a lot of T.

## 2023-05-03 NOTE — PHYSICAL EXAM
[de-identified] : Benign [de-identified] : Her wounds are completely healed without evidence of recurrent fistula or abscess

## 2023-05-03 NOTE — ASSESSMENT
[FreeTextEntry1] : 38-year-old female treated for horseshoe abscess with incision and drainage of abscess and fistula surgery. She is now fully recovered with excellent continence the vault stool and gas.

## 2023-11-20 NOTE — H&P PST ADULT - NEUROLOGICAL
"Margareth called with concern that her left groin seroma continues to swell. It is swollen down to her foot per patient.  She denies fever, redness, heat, or drainage from the area. She does have some pain there stating \"it feels like a bad bruise\".    Advised WENDY hose, ACE wrap, and elevation today and go to urgent care or ER if she has signs of infection.   Message sent to KIRSTIE.     HAFSA SANTANA RN    " negative sensation intact/responds to pain/responds to verbal commands

## 2024-08-13 ENCOUNTER — LABORATORY RESULT (OUTPATIENT)
Age: 39
End: 2024-08-13

## 2024-08-13 ENCOUNTER — APPOINTMENT (OUTPATIENT)
Dept: OBGYN | Facility: CLINIC | Age: 39
End: 2024-08-13
Payer: COMMERCIAL

## 2024-08-13 VITALS
HEIGHT: 63 IN | WEIGHT: 153 LBS | SYSTOLIC BLOOD PRESSURE: 139 MMHG | DIASTOLIC BLOOD PRESSURE: 86 MMHG | BODY MASS INDEX: 27.11 KG/M2

## 2024-08-13 DIAGNOSIS — Z00.00 ENCOUNTER FOR GENERAL ADULT MEDICAL EXAMINATION W/OUT ABNORMAL FINDINGS: ICD-10-CM

## 2024-08-13 PROCEDURE — 99385 PREV VISIT NEW AGE 18-39: CPT

## 2024-08-13 PROCEDURE — 99459 PELVIC EXAMINATION: CPT

## 2024-08-13 PROCEDURE — 36415 COLL VENOUS BLD VENIPUNCTURE: CPT

## 2024-08-13 NOTE — HISTORY OF PRESENT ILLNESS
[Excessive Bleeding] : there was excessive bleeding [Y] : Patient is sexually active [Monogamous (Male Partner)] : is monogamous with a male partner [N] : Patient denies prior pregnancies [Currently Active] : currently active [Men] : men [Vaginal] : vaginal [No] : No [FreeTextEntry1] : Patient is a 39 year  y/o presenting for an annual visit.  She is feeling well and is without complaints. She denies vaginal itching, odor and discharge. Denies urinary urgency, frequency and dysuria. LMP 08/09/2024 patient reports she has h/o of fibroids with myomectomy in 2018, was scheduled for another surgery 12/2023 and had to leave the country. Patient has been trying to have children for 8 years without success. Patient has been seen by CORI who recommended removal of fibroids prior to IVF. Patient reports menses are painful and last 15 days. Patient recently had MRI showing 2 large fibroids intramural fibroids.  PMH: infertility  PSH: Myomectomy abscess surgery  [LMPDate] : 08/09/2024 [MensesFreq] : 30 [MensesLength] : 12-15

## 2024-08-13 NOTE — PHYSICAL EXAM
[Chaperone Present] : A chaperone was present in the examining room during all aspects of the physical examination [[id number]] : A chaperone was present during the pelvic exam. [Appropriately responsive] : appropriately responsive [Alert] : alert [No Acute Distress] : no acute distress [No Lymphadenopathy] : no lymphadenopathy [Soft] : soft [Non-tender] : non-tender [Non-distended] : non-distended [No HSM] : No HSM [No Lesions] : no lesions [No Mass] : no mass [Oriented x3] : oriented x3 [Examination Of The Breasts] : a normal appearance [No Masses] : no breast masses were palpable [Labia Majora] : normal [Labia Minora] : normal [Normal] : normal [Uterine Adnexae] : normal [Scant] : There was scant vaginal bleeding [Enlarged ___ wks] : enlarged [unfilled] ~Uweeks

## 2024-08-18 LAB
ABO + RH PNL BLD: NORMAL
ALBUMIN SERPL ELPH-MCNC: 4.5 G/DL
ALP BLD-CCNC: 90 U/L
ALT SERPL-CCNC: 10 U/L
ANION GAP SERPL CALC-SCNC: 12 MMOL/L
AST SERPL-CCNC: 10 U/L
BASOPHILS # BLD AUTO: 0.02 K/UL
BASOPHILS NFR BLD AUTO: 0.4 %
BILIRUB SERPL-MCNC: 0.2 MG/DL
BLD GP AB SCN SERPL QL: NORMAL
BUN SERPL-MCNC: 9 MG/DL
C TRACH RRNA SPEC QL NAA+PROBE: NOT DETECTED
CALCIUM SERPL-MCNC: 9.2 MG/DL
CHLORIDE SERPL-SCNC: 109 MMOL/L
CO2 SERPL-SCNC: 21 MMOL/L
CREAT SERPL-MCNC: 0.79 MG/DL
CYTOLOGY CVX/VAG DOC THIN PREP: NORMAL
EGFR: 99 ML/MIN/1.73M2
EOSINOPHIL # BLD AUTO: 0.05 K/UL
EOSINOPHIL NFR BLD AUTO: 1 %
ESTIMATED AVERAGE GLUCOSE: 111 MG/DL
ESTRADIOL SERPL-MCNC: 13 PG/ML
FERRITIN SERPL-MCNC: 20 NG/ML
FOLATE SERPL-MCNC: 4.8 NG/ML
FSH SERPL-MCNC: 38.1 IU/L
GLUCOSE SERPL-MCNC: 97 MG/DL
HBA1C MFR BLD HPLC: 5.5 %
HCT VFR BLD CALC: 40.2 %
HGB BLD-MCNC: 12.5 G/DL
HPV 16 E6+E7 MRNA CVX QL NAA+PROBE: NOT DETECTED
HPV HIGH+LOW RISK DNA PNL CVX: NOT DETECTED
HPV18+45 E6+E7 MRNA CVX QL NAA+PROBE: NOT DETECTED
IMM GRANULOCYTES NFR BLD AUTO: 0.2 %
LH SERPL-ACNC: 20.5 IU/L
LYMPHOCYTES # BLD AUTO: 1.81 K/UL
LYMPHOCYTES NFR BLD AUTO: 37.5 %
MAN DIFF?: NORMAL
MCHC RBC-ENTMCNC: 28.8 PG
MCHC RBC-ENTMCNC: 31.1 GM/DL
MCV RBC AUTO: 92.6 FL
MONOCYTES # BLD AUTO: 0.33 K/UL
MONOCYTES NFR BLD AUTO: 6.8 %
N GONORRHOEA RRNA SPEC QL NAA+PROBE: NOT DETECTED
NEUTROPHILS # BLD AUTO: 2.61 K/UL
NEUTROPHILS NFR BLD AUTO: 54.1 %
PLATELET # BLD AUTO: NORMAL K/UL
POTASSIUM SERPL-SCNC: 3.9 MMOL/L
PROGEST SERPL-MCNC: 0.5 NG/ML
PROT SERPL-MCNC: 7.3 G/DL
RBC # BLD: 4.34 M/UL
RBC # FLD: 14.6 %
SODIUM SERPL-SCNC: 142 MMOL/L
SOURCE AMPLIFICATION: NORMAL
T4 FREE SERPL-MCNC: 1.7 NG/DL
T4 SERPL-MCNC: 11.9 UG/DL
TSH SERPL-ACNC: 1.27 UIU/ML
VIT B12 SERPL-MCNC: 247 PG/ML
WBC # FLD AUTO: 4.83 K/UL

## 2024-08-20 ENCOUNTER — APPOINTMENT (OUTPATIENT)
Dept: ANTEPARTUM | Facility: CLINIC | Age: 39
End: 2024-08-20

## 2024-08-20 ENCOUNTER — APPOINTMENT (OUTPATIENT)
Dept: OBGYN | Facility: CLINIC | Age: 39
End: 2024-08-20
Payer: COMMERCIAL

## 2024-08-20 VITALS
BODY MASS INDEX: 26.93 KG/M2 | DIASTOLIC BLOOD PRESSURE: 82 MMHG | HEIGHT: 63 IN | SYSTOLIC BLOOD PRESSURE: 129 MMHG | WEIGHT: 152 LBS

## 2024-08-20 DIAGNOSIS — N97.9 FEMALE INFERTILITY, UNSPECIFIED: ICD-10-CM

## 2024-08-20 DIAGNOSIS — D25.9 LEIOMYOMA OF UTERUS, UNSPECIFIED: ICD-10-CM

## 2024-08-20 PROCEDURE — 99214 OFFICE O/P EST MOD 30 MIN: CPT

## 2024-08-20 PROCEDURE — 76830 TRANSVAGINAL US NON-OB: CPT

## 2024-08-20 PROCEDURE — 76857 US EXAM PELVIC LIMITED: CPT

## 2024-08-20 NOTE — PLAN
[FreeTextEntry1] : 39 yr for f/u GYN sonogram due to h/o of fibroid uterus and over 8 years of infertility  - GYN sonogram today difficult to visualize due to habitus, see official report for further information.  - recommended MRI to better visualize anatomy  - reviewed lab results at length and the concern of premature ovarian failure being the underlying reason for infertility.  - recommended she be seen by genetics, Deanne Buck to r/o genetic linkage to infertility ( possibly Turners ) - referral given to be seen by Kamryn PERSAUD, patient and  unsure if any genetics were done by Dr Francis, will call that office to get any records from office  - RX put in for MRI, task sent to [facility] for prior authorization.  - discussed possible need for EMB.

## 2024-08-20 NOTE — HISTORY OF PRESENT ILLNESS
[FreeTextEntry1] : Patient is a 39 year y/o presenting for GYN sonogram due to h/o of fibroids and long h/ o infertility.  She is feeling well and is without complaints. She denies vaginal itching, odor and discharge. Denies urinary urgency, frequency and dysuria. LMP 08/09/2024 patient reports she has h/o of fibroids with myomectomy in 2018, was scheduled for another surgery 12/2023 and had to leave the country. Patient has been trying to have children for 8 years without success. Patient has been seen by CORI who recommended removal of fibroids prior to IVF. Patient reports menses are painful and last 15 days. Patient had MRI a year ago showing 2 large fibroids intramural fibroids.  Reviewed labs with patient and her  that her AMH is very low, indicating premature ovarian failure, her other female hormones show menopause. discussed referral for patient to Emilia Buck for genetic counseling and testing. Referral given.  Referral for shawn PERSAUD given to patient, and recommended repeat of MRI within [facility]  PMH: infertility PSH: Myomectomy anal fistula surgery

## 2024-08-20 NOTE — PROCEDURE
[Fibroid Uterus] : fibroid uterus [Abnormal Uterine Bleeding] : abnormal uterine bleeding [FreeTextEntry9] : infertility  [FreeTextEntry4] : unable to visualized ovaries  very difficult scan DUE TO habitus recommend MRI

## 2024-08-21 LAB
ANTI-MUELLERIAN HORMONE: 0.03 NG/ML
IRON SATN MFR SERPL: 12 %
IRON SERPL-MCNC: 44 UG/DL
TIBC SERPL-MCNC: 372 UG/DL
UIBC SERPL-MCNC: 328 UG/DL

## 2024-08-27 ENCOUNTER — ASOB RESULT (OUTPATIENT)
Age: 39
End: 2024-08-27

## 2024-08-27 ENCOUNTER — APPOINTMENT (OUTPATIENT)
Dept: MATERNAL FETAL MEDICINE | Facility: CLINIC | Age: 39
End: 2024-08-27
Payer: COMMERCIAL

## 2024-08-27 PROCEDURE — 99202 OFFICE O/P NEW SF 15 MIN: CPT | Mod: 95

## 2024-09-17 ENCOUNTER — APPOINTMENT (OUTPATIENT)
Dept: OBGYN | Facility: CLINIC | Age: 39
End: 2024-09-17

## 2024-09-17 VITALS
HEIGHT: 63 IN | BODY MASS INDEX: 26.4 KG/M2 | WEIGHT: 149 LBS | OXYGEN SATURATION: 98 % | HEART RATE: 99 BPM | DIASTOLIC BLOOD PRESSURE: 83 MMHG | SYSTOLIC BLOOD PRESSURE: 126 MMHG

## 2024-09-17 DIAGNOSIS — N80.9 ENDOMETRIOSIS, UNSPECIFIED: ICD-10-CM

## 2024-09-17 DIAGNOSIS — R10.2 PELVIC AND PERINEAL PAIN: ICD-10-CM

## 2024-09-17 DIAGNOSIS — D25.1 INTRAMURAL LEIOMYOMA OF UTERUS: ICD-10-CM

## 2024-09-17 DIAGNOSIS — K61.0 ANAL ABSCESS: ICD-10-CM

## 2024-09-17 DIAGNOSIS — Z86.018 PERSONAL HISTORY OF OTHER BENIGN NEOPLASM: ICD-10-CM

## 2024-09-17 DIAGNOSIS — G89.29 PELVIC AND PERINEAL PAIN: ICD-10-CM

## 2024-09-17 DIAGNOSIS — N80.129 DEEP ENDOMETRIOSIS OF OVARY, UNSPECIFIED OVARY: ICD-10-CM

## 2024-09-17 DIAGNOSIS — K61.31 HORSESHOE ABSCESS: ICD-10-CM

## 2024-09-17 DIAGNOSIS — N80.50: ICD-10-CM

## 2024-09-17 DIAGNOSIS — N70.11 CHRONIC SALPINGITIS: ICD-10-CM

## 2024-09-17 PROCEDURE — 99417 PROLNG OP E/M EACH 15 MIN: CPT | Mod: 25

## 2024-09-17 PROCEDURE — 99205 OFFICE O/P NEW HI 60 MIN: CPT

## 2024-09-18 PROBLEM — N70.11 HYDROSALPINX: Status: ACTIVE | Noted: 2024-09-18

## 2024-09-18 PROBLEM — N80.9 DEEP INFILTRATIVE ENDOMETRIOSIS: Status: ACTIVE | Noted: 2024-09-18

## 2024-09-18 PROBLEM — Z86.018 HISTORY OF UTERINE LEIOMYOMA: Status: RESOLVED | Noted: 2024-08-20 | Resolved: 2024-09-18

## 2024-09-18 PROBLEM — D25.1 INTRAMURAL LEIOMYOMA OF UTERUS: Status: ACTIVE | Noted: 2024-09-18

## 2024-09-18 PROBLEM — K61.0 PERIANAL ABSCESS: Status: RESOLVED | Noted: 2021-07-22 | Resolved: 2024-09-18

## 2024-09-18 PROBLEM — N80.129 ENDOMETRIOMA OF OVARY: Status: ACTIVE | Noted: 2024-09-18

## 2024-09-18 PROBLEM — K61.31 HORSESHOE ABSCESS: Status: RESOLVED | Noted: 2022-12-14 | Resolved: 2024-09-18

## 2024-09-18 PROBLEM — N80.50 ENDOMETRIOSIS OF INTESTINE: Status: ACTIVE | Noted: 2024-09-18

## 2024-09-18 PROBLEM — R10.2 CHRONIC FEMALE PELVIC PAIN: Status: ACTIVE | Noted: 2024-09-18

## 2024-09-18 RX ORDER — KETOROLAC TROMETHAMINE 10 MG/1
10 TABLET, FILM COATED ORAL
Qty: 20 | Refills: 1 | Status: ACTIVE | COMMUNITY
Start: 2024-09-18 | End: 1900-01-01

## 2024-09-18 NOTE — CONSULT LETTER
[Dear  ___] : Dear ~GENA, [Consult Letter:] : I had the pleasure of evaluating your patient, [unfilled]. [Please see my note below.] : Please see my note below. [Consult Closing:] : Thank you very much for allowing me to participate in the care of this patient.  If you have any questions, please do not hesitate to contact me. [Sincerely,] : Sincerely, [FreeTextEntry2] : Jannet Tsang NP, RN  [address] [place] [address]  [FreeTextEntry3] : Quintin Wiseman MD, FACOG, FACS  Minimally Invasive Gynecologic Surgery  [address] [address]  Tel: [phone]  Fax: [phone]

## 2024-09-18 NOTE — REASON FOR VISIT
[Consultation] : consultation for [Spouse] : spouse [[employer] ] : provided by [employer]   [FreeTextEntry2] : myomectomy and ovarian cyst [FreeTextEntry1] : Jannet Tsang [Interpreters_IDNumber] : [id number] [Interpreters_FullName] : Demetrice [FreeTextEntry3] : [place]

## 2024-09-18 NOTE — CONSULT LETTER
[Dear  ___] : Dear ~GENA, [Consult Letter:] : I had the pleasure of evaluating your patient, [unfilled]. [Please see my note below.] : Please see my note below. [Consult Closing:] : Thank you very much for allowing me to participate in the care of this patient.  If you have any questions, please do not hesitate to contact me. [Sincerely,] : Sincerely, [FreeTextEntry2] : Jannet Tsang NP, RN  [address] [place] [address]  [FreeTextEntry3] : Quintin Wiseman MD, FACOG, FACS  Minimally Invasive Gynecologic Surgery  [address] [address]  Tel: [phone]  Fax: [phone]

## 2024-09-18 NOTE — DISCUSSION/SUMMARY
[FreeTextEntry1] : I sat down with the patient to discuss the imaging findings & her symptoms which warrant intervention. I explained that based on MRI finding she has extensive disease around her ovaries, fallopian tubes and rectum. Extensive surgery is required. Surgical plan can only be made after seen by general surgery to determine if bowel resection is needed resolve stricture.    She needs to see urology and general surgery for surgical planning.  -get CD with images -f/u 2 weeks after consult with Dr Desai - can be virtual or in person

## 2024-09-18 NOTE — HISTORY OF PRESENT ILLNESS
[FreeTextEntry1] : 38 yo patient presents for fibroid consultation and ovarian cyst. Patient states she has had fibroid and ovarian cyst for about 5-6 years, but the pain is progressively getting worse over the years. She currently taking Motrin with food to help with the pain. She takes the pills even if she doesn't have her menses. She has seen a fertility doctor and was told she may face difficulties trying to conceive. She has been having digestion problems and finds that if she eats after 7 pm she gets a stomachache and can't sleep. She denies vomiting. She moves her bowels very small amounts 5-6 times a day. Patient reports not hearing about endometriosis before of have understanding of extent of disease. Reports laparoscopic cystectomy in the past [facility] she does not know the type of cyst.

## 2024-09-18 NOTE — REASON FOR VISIT
[Consultation] : consultation for [Spouse] : spouse [[employer] ] : provided by [employer]   [FreeTextEntry2] : myomectomy and ovarian cyst [FreeTextEntry1] : Jannet Tsang [Interpreters_IDNumber] : [id number] [Interpreters_FullName] : Demetrice [FreeTextEntry3] : [place]

## 2024-09-18 NOTE — HISTORY OF PRESENT ILLNESS
[FreeTextEntry1] : 40 yo patient presents for fibroid consultation and ovarian cyst. Patient states she has had fibroid and ovarian cyst for about 5-6 years, but the pain is progressively getting worse over the years. She currently taking Motrin with food to help with the pain. She takes the pills even if she doesn't have her menses. She has seen a fertility doctor and was told she may face difficulties trying to conceive. She has been having digestion problems and finds that if she eats after 7 pm she gets a stomachache and can't sleep. She denies vomiting. She moves her bowels very small amounts 5-6 times a day. Patient reports not hearing about endometriosis before of have understanding of extent of disease. Reports laparoscopic cystectomy in the past [facility] she does not know the type of cyst.

## 2024-10-31 ENCOUNTER — APPOINTMENT (OUTPATIENT)
Dept: COLORECTAL SURGERY | Facility: CLINIC | Age: 39
End: 2024-10-31

## 2024-11-05 ENCOUNTER — APPOINTMENT (OUTPATIENT)
Dept: OBGYN | Facility: CLINIC | Age: 39
End: 2024-11-05

## 2024-11-05 ENCOUNTER — NON-APPOINTMENT (OUTPATIENT)
Age: 39
End: 2024-11-05

## 2024-11-05 VITALS
OXYGEN SATURATION: 100 % | SYSTOLIC BLOOD PRESSURE: 109 MMHG | WEIGHT: 148 LBS | DIASTOLIC BLOOD PRESSURE: 73 MMHG | HEART RATE: 84 BPM | BODY MASS INDEX: 26.22 KG/M2 | HEIGHT: 63 IN

## 2024-11-05 PROCEDURE — 99214 OFFICE O/P EST MOD 30 MIN: CPT

## 2024-11-13 VITALS
WEIGHT: 141.98 LBS | HEART RATE: 90 BPM | DIASTOLIC BLOOD PRESSURE: 70 MMHG | OXYGEN SATURATION: 100 % | TEMPERATURE: 97 F | SYSTOLIC BLOOD PRESSURE: 120 MMHG | RESPIRATION RATE: 16 BRPM | HEIGHT: 63 IN

## 2024-11-13 NOTE — ASU PATIENT PROFILE, ADULT - FALL HARM RISK - UNIVERSAL INTERVENTIONS
Bed in lowest position, wheels locked, appropriate side rails in place/Call bell, personal items and telephone in reach/Instruct patient to call for assistance before getting out of bed or chair/Non-slip footwear when patient is out of bed/[place] to call system/Physically safe environment - no spills, clutter or unnecessary equipment/Purposeful Proactive Rounding/Room/bathroom lighting operational, light cord in reach

## 2024-11-13 NOTE — ASU PATIENT PROFILE, ADULT - NSTOBACCONEVERSMOKERY/N_GEN_A
No
no chest pain/no palpitations/no dyspnea on exertion/no orthopnea/no paroxysmal nocturnal dyspnea/no peripheral edema

## 2024-11-13 NOTE — ASU PATIENT PROFILE, ADULT - NSICDXPASTMEDICALHX_GEN_ALL_CORE_FT
PAST MEDICAL HISTORY:  Anal fistula     Endometriosis     Fibroids uterine    Ovarian cyst     Perianal abscess

## 2024-11-14 ENCOUNTER — APPOINTMENT (OUTPATIENT)
Dept: OBGYN | Facility: HOSPITAL | Age: 39
End: 2024-11-14

## 2024-11-14 ENCOUNTER — TRANSCRIPTION ENCOUNTER (OUTPATIENT)
Age: 39
End: 2024-11-14

## 2024-11-14 ENCOUNTER — INPATIENT (INPATIENT)
Facility: HOSPITAL | Age: 39
LOS: 4 days | Discharge: HOME | End: 2024-11-19
Attending: SURGERY | Admitting: SURGERY
Payer: COMMERCIAL

## 2024-11-14 DIAGNOSIS — K60.3 ANAL FISTULA: Chronic | ICD-10-CM

## 2024-11-14 DIAGNOSIS — Z98.890 OTHER SPECIFIED POSTPROCEDURAL STATES: Chronic | ICD-10-CM

## 2024-11-14 PROCEDURE — 88305 TISSUE EXAM BY PATHOLOGIST: CPT | Mod: 26

## 2024-11-14 PROCEDURE — 58661 LAPAROSCOPY REMOVE ADNEXA: CPT | Mod: 50,59

## 2024-11-14 PROCEDURE — 58662 LAPAROSCOPY EXCISE LESIONS: CPT | Mod: 59

## 2024-11-14 PROCEDURE — 88341 IMHCHEM/IMCYTCHM EA ADD ANTB: CPT | Mod: 26

## 2024-11-14 PROCEDURE — 58546 LAPARO-MYOMECTOMY COMPLEX: CPT

## 2024-11-14 PROCEDURE — 88342 IMHCHEM/IMCYTCHM 1ST ANTB: CPT | Mod: 26

## 2024-11-14 DEVICE — SURGICEL FIBRILLAR 4 X 4": Type: IMPLANTABLE DEVICE | Status: FUNCTIONAL

## 2024-11-14 DEVICE — ARISTA 3GR: Type: IMPLANTABLE DEVICE | Status: FUNCTIONAL

## 2024-11-14 RX ORDER — ACETAMINOPHEN 500MG 500 MG/1
1000 TABLET, COATED ORAL EVERY 6 HOURS
Refills: 0 | Status: DISCONTINUED | OUTPATIENT
Start: 2024-11-14 | End: 2024-11-19

## 2024-11-14 RX ORDER — OXYCODONE HYDROCHLORIDE 30 MG/1
5 TABLET ORAL EVERY 6 HOURS
Refills: 0 | Status: DISCONTINUED | OUTPATIENT
Start: 2024-11-14 | End: 2024-11-19

## 2024-11-14 RX ORDER — 0.9 % SODIUM CHLORIDE 0.9 %
1000 INTRAVENOUS SOLUTION INTRAVENOUS
Refills: 0 | Status: DISCONTINUED | OUTPATIENT
Start: 2024-11-14 | End: 2024-11-18

## 2024-11-14 RX ORDER — HEPARIN SODIUM,PORCINE 1000/ML
5000 VIAL (ML) INJECTION ONCE
Refills: 0 | Status: COMPLETED | OUTPATIENT
Start: 2024-11-14 | End: 2024-11-14

## 2024-11-14 RX ORDER — HEPARIN SODIUM,PORCINE 1000/ML
5000 VIAL (ML) INJECTION EVERY 8 HOURS
Refills: 0 | Status: DISCONTINUED | OUTPATIENT
Start: 2024-11-15 | End: 2024-11-17

## 2024-11-14 RX ORDER — HYDROMORPHONE HYDROCHLORIDE 2 MG/1
0.5 TABLET ORAL EVERY 6 HOURS
Refills: 0 | Status: DISCONTINUED | OUTPATIENT
Start: 2024-11-14 | End: 2024-11-15

## 2024-11-14 RX ORDER — ONDANSETRON HYDROCHLORIDE 4 MG/1
4 TABLET, FILM COATED ORAL EVERY 6 HOURS
Refills: 0 | Status: DISCONTINUED | OUTPATIENT
Start: 2024-11-14 | End: 2024-11-19

## 2024-11-14 RX ORDER — ACETAMINOPHEN 500MG 500 MG/1
1000 TABLET, COATED ORAL ONCE
Refills: 0 | Status: COMPLETED | OUTPATIENT
Start: 2024-11-14 | End: 2024-11-14

## 2024-11-14 RX ORDER — INFLUENZA VIRUS VACCINE 15; 15; 15; 15 UG/.5ML; UG/.5ML; UG/.5ML; UG/.5ML
0.5 SUSPENSION INTRAMUSCULAR ONCE
Refills: 0 | Status: DISCONTINUED | OUTPATIENT
Start: 2024-11-14 | End: 2024-11-19

## 2024-11-14 RX ADMIN — ACETAMINOPHEN 500MG 1000 MILLIGRAM(S): 500 TABLET, COATED ORAL at 14:29

## 2024-11-14 RX ADMIN — Medication 5000 UNIT(S): at 14:29

## 2024-11-14 RX ADMIN — ACETAMINOPHEN 500MG 1000 MILLIGRAM(S): 500 TABLET, COATED ORAL at 21:34

## 2024-11-14 RX ADMIN — ACETAMINOPHEN 500MG 1000 MILLIGRAM(S): 500 TABLET, COATED ORAL at 22:41

## 2024-11-14 NOTE — BRIEF OPERATIVE NOTE - SPECIMENS
myomectomy specimens, cyst walls
myomas, myometrium, endometrium, pelvic cyst, left ovarian cyst x3, right ovarian cyst

## 2024-11-14 NOTE — CHART NOTE - NSCHARTNOTEFT_GEN_A_CORE
Pt seen and examined at bedside. Pt complains of mild abdominal pain.   Pt denies any fever, chills, chest pain, SOB, nausea or vomiting     T(F): 98.2 (11-14-24 @ 20:31), Max: 98.2 (11-14-24 @ 20:31)  HR: 92 (11-14-24 @ 22:16) (78 - 93)  BP: 106/58 (11-14-24 @ 22:16) (91/53 - 120/70)  RR: 24 (11-14-24 @ 22:16) (11 - 24)  SpO2: 98% (11-14-24 @ 22:16) (95% - 100%)  Wt(kg): --      acetaminophen     Tablet .. 1000 milliGRAM(s) Oral every 6 hours PRN Mild Pain (1 - 3)  HYDROmorphone  Injectable 0.5 milliGRAM(s) IV Push every 6 hours PRN breakthrough pain in the PACU  lactated ringers. 1000 milliLiter(s) IV Continuous <Continuous>  ondansetron Injectable 4 milliGRAM(s) IV Push every 6 hours PRN Nausea and/or Vomiting  oxyCODONE    IR 5 milliGRAM(s) Oral every 6 hours PRN Moderate Pain (4 - 6)      Physical exam:  Constitutional: NAD  Abdomen: incision site clean, dry and intact. Soft, mildly tender, nondistended  Extremities: no lower extremity edema, or calve tenderness. SCDs in place    A:   39y, s/p  RA endo excision, myomectomy, adenomyomectomy, b/l cystectomy, LS, RUSSELL, sigmoidoscopy, rectal serosal oversew by gen surg, cysto. Uncomplicated.   Post operative check performed.    Plan:  CLD   LR 100cc/hr   SQH   Tylenol q6   Zofran   Oxy 5 PRN  s/p israel f/u TOV  AM labs  PACU platelets 46

## 2024-11-14 NOTE — BRIEF OPERATIVE NOTE - OPERATION/FINDINGS
Procedure: Robotic assisted uterine myomectomy, cyst fenestration (by GYN), RUSSELL, rectal mobilization, rectal seromuscular repair, flexible sigmoidoscopy  Indication: Endometriosis, uterine fibroids, adhesive disease    Patient prepped and draped in sterile fashion. Cutdown performed at umbilicus. Abdomen insufflated and inspected. Noted large fibroid uterus, with multiple adnexal and posterior uterine cysts. Rectosigmoid colon densely adherent to the posterior uterine wall. Additional 8mm trocars placed in the upper abdomen. Extensive RUSSELL was performed to separate the rectosigmoid colon from the posterior uterine wall. Multiple myomectomies and cyst fenestrations performed by GYN team. Attention turned to rectosigmoid colon. Flexible sigmoidoscopy was performed without any noted mucosal abnormalities. Pelvis irrigated and sigmoid colon was insufflated. Air leak test performed, without any noted air bubbles. Small rectal seromuscular repair was performed. Remainder of case by GYN team please see operative dictation for further information.

## 2024-11-14 NOTE — H&P ADULT - HISTORY OF PRESENT ILLNESS
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     Patient feels well today without any acute complaints.       PMH: Anal Fistula Perianal Abscess requiring multiple Seton placements, now S/P Juanita procedure for horseshoe abscess, Endometriosis, uterine leiomyomas, Ovarian cyst   PSHx: Anal fistula surgery, H/O ovarian cystectomy left  Medications: None  Allergies: NKDA, shellfish allergy  Social Hx: denies  Family Hx: Denies family hx of IBS, Crohn's, UC, or colon cancer.

## 2024-11-14 NOTE — PROGRESS NOTE ADULT - SUBJECTIVE AND OBJECTIVE BOX
Procedure:  RA lap endometriosis excision, uterine myomectomy, cyst fenestrations, rectal seromuscular repair, and flexible sigmoidoscopy   Surgeon: Dr. Desai     S: Pt resting comfortably in bed, states she has some pain, but it is controlled with medication. Denies CP, SOB, MADDEN, calf tenderness.    O:  T(C): 36.8 (11-14-24 @ 20:31), Max: 36.8 (11-14-24 @ 20:31)  T(F): 98.2 (11-14-24 @ 20:31), Max: 98.2 (11-14-24 @ 20:31)  HR: 90 (11-14-24 @ 21:46) (78 - 93)  BP: 96/53 (11-14-24 @ 21:46) (91/53 - 115/58)  RR: 19 (11-14-24 @ 21:46) (11 - 20)  SpO2: 95% (11-14-24 @ 21:46) (95% - 100%)  Wt(kg): --                        8.7    12.11 )-----------( 46       ( 14 Nov 2024 21:04 )             27.7     11-14    139  |  107  |  7   ----------------------------<  175[H]  3.5   |  22  |  0.73    Ca    7.8[L]      14 Nov 2024 21:04  Phos  4.1     11-14  Mg     1.7     11-14        Gen: NAD, resting comfortably in bed  C/V: NSR  Pulm: Nonlabored breathing, no respiratory distress  Abd: soft, NT/ND Incision:  CDI  Extrem: WWP, no calf edema, SCDs in place

## 2024-11-14 NOTE — H&P ADULT - NSHPPHYSICALEXAM_GEN_ALL_CORE
ICU Vital Signs Last 24 Hrs  T(C): 36.2 (14 Nov 2024 15:14), Max: 36.2 (14 Nov 2024 15:14)  T(F): --  HR: 90 (14 Nov 2024 15:14) (90 - 90)  BP: 120/70 (14 Nov 2024 15:14) (120/70 - 120/70)  BP(mean): --  ABP: --  ABP(mean): --  RR: 16 (14 Nov 2024 15:14) (16 - 16)  SpO2: 100% (14 Nov 2024 15:14) (100% - 100%)    Physical Exam  General: AAOx3, NAD, laying comfortably in bed  Cardio: S1,S2, No MRG  Pulm: Nonlabored breathing  Abdomen: soift, NTND abdomen.   Extremities: WWP, peripheral pulses appreciated

## 2024-11-14 NOTE — BRIEF OPERATIVE NOTE - NSICDXBRIEFPREOP_GEN_ALL_CORE_FT
PRE-OP DIAGNOSIS:  Uterine fibroid 14-Nov-2024 19:05:54  Jimmy Perez  
PRE-OP DIAGNOSIS:  Endometriosis 14-Nov-2024 17:59:42  Dina Elizondo

## 2024-11-14 NOTE — PROGRESS NOTE ADULT - ASSESSMENT
The ECG shows a sinus rhythm.  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     Tele  CLD/IVF  Pain/nausea control prn  Doesnt take any home meds  SCDs, SQH  AM labs

## 2024-11-14 NOTE — BRIEF OPERATIVE NOTE - OPERATION/FINDINGS
Patient prepped and draped in dorsal lithotomy  Vaginal prep with betadine  Abdominal prep with chlorhexidine  Cervix serially dilated to #18; Humi uterine manipulator placed  Abdominal entry via Rita technique; 4 port sites placed in usual fashion  Abdominal survey revealed densely adherent bowel to posterior aspect of uterus; lysis of adhesions was performed  Myomectomy performed with good effect; # fibroids removed Patient prepped and draped in dorsal lithotomy  Vaginal prep with betadine  Abdominal prep with chlorhexidine  Cervix serially dilated to #18; Humi uterine manipulator placed  Abdominal entry via Rita technique; 4 port sites placed in usual fashion  Abdominal survey revealed densely adherent bowel to posterior aspect of uterus; lysis of adhesions was performed  Myomectomy performed with 10 fibroids removed. Adenomyomectomy performed of right posterior uterus and uterine cyst removed. Some of endometrial lining removed in process.  Left salpingectomy performed due to dense adhesions  Bilateral ovarian cystectomy with endometriomas noted. Left ovary closed with v-lock suture.   Sigmoidoscopy and rectal oversew performed by general surgery- see brief op note for details.  Specimens removed via umbilicus. Rodolfo placed over uterus and ovaries, fibrillar placed in right posterior culdesac for additional hemostasis.  Cystoscopy performed with normal bladder survey and bilateral ureteral jets visualized.  Fascia at umbilicus closed with vicryl suture, skin closed with monocryl and dermabond suture.

## 2024-11-14 NOTE — H&P ADULT - NSHPLABSRESULTS_GEN_ALL_CORE
Labs as per outpatient records.     MRI SCAN OF THE PELVIS WITHOUT AND WITH GADOLINIUM    Clinical History:    39-year-old female with history of cyst and fibroid seen on outside ultrasound.  Technique:    Magnetic resonance imaging of the pelvis was performed prior to and after the administrationof intravenous gadolinium. Images were obtained utilizing axial T1 and fat suppressed [id number] sequences. Axial sagittal coronal and oblique T2 weighted images were obtained though the uterus and adnexae. An axial fat suppressed T1 weighted gradient echo series was performed prior to the administration of intravenous gadolinium. After the administration of intravenous gadolinium axial and coronal fat suppressed T1 weighted gradient echo images    Patient: TARAN RIBERA : 1985 Exam Date: 10/2/2024 Acc No: [id number] MRN: [id number]  were performed. Coronal single shot fast spin echo images were obtained through the  kidneys.    Comparison:  There are no prior studies available for comparison.    Findings:  The uterus is enlarged, measuring approximately 12.2 x 7.9 x 10.4 cm in size. There are  multiple myomas present, there is what likely represents subserosal myoma, measuring  approximately 2.7 x 3.6 x 3.9 cm in size. There is an intramural myoma, toward the lower  uterine segment left of midline, measuring 4.6 x 3.9 x 3.8 cm in size. There is a subserosal  myoma, at the fundus measuring approximately 2.2 x 1.6 x 2.0 cm in size. Several other  myomas are identified. The endometrial cavity is somewhat displaced secondary to the  subserosal myoma.  There is a complex fluid collection identified within the cul-de-sac, measuring up to 15.0 cm in craniocaudad dimension by 7.2 cm in transverse dimension by 3.7 cm in anterior-dimension. On fat-suppressed images, there is some linear areas of increased signal intensity identified, in the possibilities represents an endometrioma is raised. There are multiple lesions identified, which demonstrates increased signal intensity on T1 weighted sequences for example within the right ovary there is a lesion which measures approximately 3.6 x 2.2 x 1.7 cm in size, consistent with an endometrioma. There are focal areas of increased signal intensity noted posterior to the uterus, left of midline adjacent to a bilobed lesion. This bilobed lesion measures approximately 3.9 x 2.5 x 3.2 cm in size. There components which are hyperintense on T1 weighted sequences, also consistent with endometrioma. There is a linear band of abnormal signal intensity, extending from the lower uterine segment/cervical region, posteriorly and abuts the region of the Iliococcygeus muscle on the left. Presumably representing endometriosis. This area extends over approximately 3.5 x 0.9 cm. The right ovary is identified, and measures approximately 2 point by 1.0 x 1.9 cm in size. The left ovary, likely contains the 2 left-sided endometriomas, and measures up to 4.3 x 3.5 by 4.5 cm in size.  There is no evidence of pelvic adenopathy. The visualized osseous structures are intact. The bladder is displaced secondary to the leiomyomatous uterus.    IMPRESSION  Patient: TARAN RIBERA : 1985 Exam Date: 10/2/2024 Acc No: [id number] MRN: [id number]  Leiomyomatous uterus, with intramural subserosal and submucosal myomas.  Loculated fluid collection noted in the cul-de-sac with several areas which are consistent with endometriosis and endometriomas. These are detailed above. Linear stranding noted posteriorly on the left extending to the region of the Iliococcygeus muscle. Findings are most consistent with endometriosis and scarring.  Electronically Signed by Sanjiv Eric M.D.  Date/Time Transcribed: 10- 3:59 PM

## 2024-11-14 NOTE — H&P ADULT - ASSESSMENT
40yo Female pt with PMHx perianal Fistula and abscess requiring multiple Seton placements, s/p Juanita procedure for horseshoe abscess, endometriosis, uterine fibroids, ovarian cysts s/p prior left ovarian cystectomy, who presents for an elective robotic endometriosis excision, uterine myomectomy with GYN, possible sigmoidectomy with general surgery.      Plan:  - proceed to OR

## 2024-11-14 NOTE — PATIENT PROFILE ADULT - HAVE YOU RECENTLY LOST WEIGHT WITHOUT TRYING?
----- Message from Josiah Turcios MD sent at 7/23/2019  3:27 PM CDT -----  Regarding: FW: Question      ----- Message -----  From: Phill Shaffer CMA  Sent: 7/23/2019   3:24 PM  To: Josiah Turcios MD  Subject: FW: Question                                         ----- Message -----  From: Luz Marina Jama  Sent: 7/23/2019   3:19 PM  To: , #  Subject: Question                                         Justine has a general question [phone]   No (0)

## 2024-11-14 NOTE — BRIEF OPERATIVE NOTE - NSICDXBRIEFPROCEDURE_GEN_ALL_CORE_FT
PROCEDURES:  Excision, endometriosis, laparoscopic, robot-assisted 14-Nov-2024 17:59:32  Dina Elizondo  
PROCEDURES:  Sigmoidoscopy 14-Nov-2024 19:05:23  Jimmy Perez  Repair, tear, sigmoid colon, serosal, laparoscopic 14-Nov-2024 19:05:34 robotic Jimmy Perez

## 2024-11-14 NOTE — PATIENT PROFILE ADULT - FALL HARM RISK - RISK INTERVENTIONS
Assistance OOB with selected safe patient handling equipment/Assistance with ambulation/Communicate Fall Risk and Risk Factors to all staff, patient, and family/Monitor gait and stability/Reinforce activity limits and safety measures with patient and family/Sit up slowly, dangle for a short time, stand at bedside before walking/Use of alarms - bed, chair and/or voice tab/Visual Cue: Yellow wristband/Bed in lowest position, wheels locked, appropriate side rails in place/Call bell, personal items and telephone in reach/Instruct patient to call for assistance before getting out of bed or chair/Non-slip footwear when patient is out of bed/[place] to call system/Physically safe environment - no spills, clutter or unnecessary equipment/Purposeful Proactive Rounding/Room/bathroom lighting operational, light cord in reach

## 2024-11-14 NOTE — PRE-ANESTHESIA EVALUATION ADULT - NSPROPOSEDPROCEDFT_GEN_ALL_CORE
Robotic treatment of endometriosis, RUSSELL, possible repair of bowel, possible colectomy, possible stoma, possible sigmoidoscopy, possible myomectomy, b/l ovarian cystectomy, possible salpingectomy, possible cystoscopy. possible hysterectomy, possible laparatomy.

## 2024-11-15 ENCOUNTER — TRANSCRIPTION ENCOUNTER (OUTPATIENT)
Age: 39
End: 2024-11-15

## 2024-11-15 LAB
ANION GAP SERPL CALC-SCNC: 9 MMOL/L — SIGNIFICANT CHANGE UP (ref 5–17)
BUN SERPL-MCNC: 6 MG/DL — LOW (ref 7–23)
CALCIUM SERPL-MCNC: 7.8 MG/DL — LOW (ref 8.4–10.5)
CHLORIDE SERPL-SCNC: 110 MMOL/L — HIGH (ref 96–108)
CO2 SERPL-SCNC: 23 MMOL/L — SIGNIFICANT CHANGE UP (ref 22–31)
CREAT SERPL-MCNC: 0.69 MG/DL — SIGNIFICANT CHANGE UP (ref 0.5–1.3)
EGFR: 115 ML/MIN/1.73M2 — SIGNIFICANT CHANGE UP
GLUCOSE SERPL-MCNC: 122 MG/DL — HIGH (ref 70–99)
HCT VFR BLD CALC: 25 % — LOW (ref 34.5–45)
HCT VFR BLD CALC: 27.7 % — LOW (ref 34.5–45)
HGB BLD-MCNC: 7.7 G/DL — LOW (ref 11.5–15.5)
HGB BLD-MCNC: 8.3 G/DL — LOW (ref 11.5–15.5)
MAGNESIUM SERPL-MCNC: 2 MG/DL — SIGNIFICANT CHANGE UP (ref 1.6–2.6)
MCHC RBC-ENTMCNC: 26.2 PG — LOW (ref 27–34)
MCHC RBC-ENTMCNC: 26.6 PG — LOW (ref 27–34)
MCHC RBC-ENTMCNC: 30 G/DL — LOW (ref 32–36)
MCHC RBC-ENTMCNC: 30.8 G/DL — LOW (ref 32–36)
MCV RBC AUTO: 86.5 FL — SIGNIFICANT CHANGE UP (ref 80–100)
MCV RBC AUTO: 87.4 FL — SIGNIFICANT CHANGE UP (ref 80–100)
NRBC # BLD: 0 /100 WBCS — SIGNIFICANT CHANGE UP (ref 0–0)
NRBC # BLD: 0 /100 WBCS — SIGNIFICANT CHANGE UP (ref 0–0)
PHOSPHATE SERPL-MCNC: 3.7 MG/DL — SIGNIFICANT CHANGE UP (ref 2.5–4.5)
PLATELET # BLD AUTO: 148 K/UL — LOW (ref 150–400)
PLATELET # BLD AUTO: 61 K/UL — LOW (ref 150–400)
POTASSIUM SERPL-MCNC: 3.6 MMOL/L — SIGNIFICANT CHANGE UP (ref 3.5–5.3)
POTASSIUM SERPL-SCNC: 3.6 MMOL/L — SIGNIFICANT CHANGE UP (ref 3.5–5.3)
RBC # BLD: 2.89 M/UL — LOW (ref 3.8–5.2)
RBC # BLD: 3.17 M/UL — LOW (ref 3.8–5.2)
RBC # FLD: 13.8 % — SIGNIFICANT CHANGE UP (ref 10.3–14.5)
RBC # FLD: 13.9 % — SIGNIFICANT CHANGE UP (ref 10.3–14.5)
SODIUM SERPL-SCNC: 142 MMOL/L — SIGNIFICANT CHANGE UP (ref 135–145)
WBC # BLD: 10.83 K/UL — HIGH (ref 3.8–10.5)
WBC # BLD: 8.2 K/UL — SIGNIFICANT CHANGE UP (ref 3.8–10.5)
WBC # FLD AUTO: 10.83 K/UL — HIGH (ref 3.8–10.5)
WBC # FLD AUTO: 8.2 K/UL — SIGNIFICANT CHANGE UP (ref 3.8–10.5)

## 2024-11-15 PROCEDURE — 99232 SBSQ HOSP IP/OBS MODERATE 35: CPT | Mod: 57

## 2024-11-15 RX ORDER — HYDROMORPHONE HYDROCHLORIDE 2 MG/1
0.5 TABLET ORAL ONCE
Refills: 0 | Status: DISCONTINUED | OUTPATIENT
Start: 2024-11-15 | End: 2024-11-15

## 2024-11-15 RX ORDER — POTASSIUM CHLORIDE 600 MG/1
40 TABLET, EXTENDED RELEASE ORAL ONCE
Refills: 0 | Status: COMPLETED | OUTPATIENT
Start: 2024-11-15 | End: 2024-11-15

## 2024-11-15 RX ADMIN — ONDANSETRON HYDROCHLORIDE 4 MILLIGRAM(S): 4 TABLET, FILM COATED ORAL at 13:38

## 2024-11-15 RX ADMIN — HYDROMORPHONE HYDROCHLORIDE 0.5 MILLIGRAM(S): 2 TABLET ORAL at 23:13

## 2024-11-15 RX ADMIN — HYDROMORPHONE HYDROCHLORIDE 0.5 MILLIGRAM(S): 2 TABLET ORAL at 22:39

## 2024-11-15 RX ADMIN — OXYCODONE HYDROCHLORIDE 5 MILLIGRAM(S): 30 TABLET ORAL at 11:24

## 2024-11-15 RX ADMIN — POTASSIUM CHLORIDE 40 MILLIEQUIVALENT(S): 600 TABLET, EXTENDED RELEASE ORAL at 11:24

## 2024-11-15 NOTE — DISCHARGE NOTE PROVIDER - CARE PROVIDER_API CALL
Jannet Desai [facility]  Surgery  [address] [place] [address], [address]  [place] [address]  Phone: [phone]  Established Patient  Follow Up Time:    Jannet Desai [facility]  Surgery  [address] [place] [address], [address]  [place] [address]  Phone: [phone]  Established Patient  Follow Up Time: 1 week

## 2024-11-15 NOTE — DISCHARGE NOTE PROVIDER - NSDCCPTREATMENT_GEN_ALL_CORE_FT
PRINCIPAL PROCEDURE  Procedure: Excision, endometriosis, laparoscopic, robot-assisted  Findings and Treatment:       SECONDARY PROCEDURE  Procedure: Repair, tear, sigmoid colon, serosal, laparoscopic  Findings and Treatment: robotic    Procedure: Sigmoidoscopy  Findings and Treatment:

## 2024-11-15 NOTE — DISCHARGE NOTE PROVIDER - NPI NUMBER (FOR SYSADMIN USE ONLY) :
[[id number]]
I performed a face-to-face evaluation of the patient and performed a history and physical examination. I agree with the history and physical examination. If this was a PA visit, I personally saw the patient with the PA and performed a substantive portion of the visit including all aspects of the medical decision making.    Feels something abnormal under fingernails. No abnormality seen. No pain/itching/rash/skin break. Reassurance.

## 2024-11-15 NOTE — PROGRESS NOTE ADULT - ASSESSMENT
A/P 38yo Female pt with PMHx perianal Fistula and abscess requiring multiple Seton placements, s/p Juanita procedure for horseshoe abscess, endometriosis, uterine fibroids, ovarian cysts s/p prior left ovarian cystectomy, who is now s/p RA lap endometriosis excision, uterine myomectomy, cyst fenestrations, rectal seromuscular repair, and flexible sigmoidoscopy (11/14).     Tele  CLD/IVF  Pain/nausea control prn  Doesnt take any home meds  SCDs, SQH  AM labs

## 2024-11-15 NOTE — PROGRESS NOTE ADULT - ASSESSMENT
40 y/o s/p RA endo excision, myomectomy, adenomyomectomy, b/l cystectomy, LS, RUSSELL, sigmoidoscopy, rectal serosal oversew by gen surg, cysto (EBL 1000)     Neuro: tylenol, oxy   CV: VSS   Pulm: RA   GI: CLD, LR 100cc/hr, zofran, -/-   : s/p israel, voiding   GYN: s/p RA endo excision, myomectomy   DVT ppx: SCDs, SQH       [ ] F/u AM CBC/BMP/Mg/Phos 11/15 (ordered)   [ ] Meeting postop milestones  [ ] Rest of care per primary team 38 y/o s/p RA endo excision, myomectomy, adenomyomectomy, b/l cystectomy, LS, RUSSELL, sigmoidoscopy, rectal serosal oversew by gen surg, cysto (EBL 1000)     Neuro: tylenol, oxy   CV: VSS   Pulm: RA   GI: CLD, LR 100cc/hr, zofran, -/-   : s/p israel, voiding   GYN: s/p RA endo excision, myomectomy   DVT ppx: SCDs, SQH     -RECOMMEND: IV iron 300mg [id number]  [ ] F/u AM CBC/BMP/Mg/Phos 11/15 (ordered)   [ ] Meeting postop milestones  [ ] Rest of care per primary team

## 2024-11-15 NOTE — DISCHARGE NOTE PROVIDER - INSTRUCTIONS
Please continue a LOW-FIBER DIET. Listed below are some foods you may eat and those you should avoid.   --Allowed foods: White bread without nuts and seeds, White rice, plain white pasta, and crackers, Refined hot cereals, such as Cream of Wheat, or cold cereals with less than 1 gram of fiber per serving, Pancakes or waffles made from white refined flour, Most canned or well-cooked vegetables and fruits without skins or seeds, Fruit and vegetable juice with little or no pulp, fruit-flavored drinks, and flavored brito, Tender meat, poultry, fish, eggs and tofu, Milk and foods made from milk — such as yogurt, pudding, ice cream, cheeses and sour cream — if tolerated, Butter, margarine, oils and salad dressings without seeds  --Foods to avoid: Whole-wheat or whole-grain breads, cereals and pasta, Brown or wild rice and other whole grains, such as oats, kasha, barley and quinoa, Dried fruits and prune juice, Raw fruit, including those with seeds, skin or membranes, such as berries, Raw or undercooked vegetables, including corn, Dried beans, peas and lentils, Seeds and nuts and foods containing them, including peanut butter and other nut butters, Coconut, Popcorn

## 2024-11-15 NOTE — DISCHARGE NOTE PROVIDER - HOSPITAL COURSE
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 On 11/14, she underwent RA lap endometriosis excision, uterine myomectomy, cyst fenestrations, rectal seromuscular repair, and flexible sigmoidoscopy (11/14).     ...year old male/female with PMH .. presents to [facility] on ... for elective surgery. Taken to the OR on... for .... Transferred to PACU in stable condition. No perioperative complications noted. Diet advanced as tolerated and per return of bowel function. At time of discharge pt is tolerating diet, pain well controlled, pt is ambulating/voiding freely, having adequate bowel function. Pt is HD stable and medically ready for discharge.    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 On 11/14, she underwent RA lap endometriosis excision, uterine myomectomy, cyst fenestrations, rectal seromuscular repair, and flexible sigmoidoscopy (11/14).Transferred to PACU in stable condition. No perioperative complications noted. Diet advanced as tolerated and per return of bowel function. At time of discharge pt is tolerating diet, pain well controlled, pt is ambulating/voiding freely, having adequate bowel function. Pt is HD stable and medically ready for discharge.

## 2024-11-15 NOTE — CONSULT NOTE ADULT - ASSESSMENT
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 Patient is now s/p procedure, with c/o abdominal pain controlled on current pain regiment, also c/o Right shoulder pain. Pt on tele monitoring due to blood loss intra op with post op normocytic anemia. Patient with sinus tachycardia, mildly low BPs. Pt denies chest pain / palpiatations / fevers / chills / N /V.   Pain management to continue  IV Iron for normocytic anemia   advance diet as tolerated  IVF resuscitation  monitor tachycardia closely, sinus tachy on tele, possibly secondary to pain, monitor Hb closely as well transfuse PRN for hb < 7 g/dl, or if anemia symptomatic

## 2024-11-15 NOTE — DISCHARGE NOTE PROVIDER - NSDCFUSCHEDAPPT_GEN_ALL_CORE_FT
[facility]  [facility] [address]  Scheduled Appointment: 11/26/2024    [facility]  [facility] [address]  Scheduled Appointment: 12/05/2024    [facility] [address]  Scheduled Appointment: 12/24/2024

## 2024-11-15 NOTE — PROGRESS NOTE ADULT - SUBJECTIVE AND OBJECTIVE BOX
POST-OP DAY: #1 s/p RA lap endometriosis excision, uterine myomectomy, cyst fenestrations, rectal seromuscular repair, and flexible sigmoidoscopy      SUBJECTIVE: Patient seen and examined bedside by chief resident on AM rounds. Patient states she feels well and has no acute complaints. Patient tolerating clears without any nausea/vomiting. -BF     heparin   Injectable 5000 Unit(s) SubCutaneous every 8 hours    MEDICATIONS  (PRN):  acetaminophen     Tablet .. 1000 milliGRAM(s) Oral every 6 hours PRN Mild Pain (1 - 3)  HYDROmorphone  Injectable 0.5 milliGRAM(s) IV Push every 6 hours PRN breakthrough pain in the PACU  ondansetron Injectable 4 milliGRAM(s) IV Push every 6 hours PRN Nausea and/or Vomiting  oxyCODONE    IR 5 milliGRAM(s) Oral every 6 hours PRN Moderate Pain (4 - 6)      I&O's Detail    14 Nov 2024 07:01  -  15 Nov 2024 06:58  --------------------------------------------------------  IN:    Lactated Ringers: 700 mL  Total IN: 700 mL    OUT:    Voided (mL): 280 mL  Total OUT: 280 mL    Total NET: 420 mL          Vital Signs Last 24 Hrs  T(C): 36.7 (15 Nov 2024 04:54), Max: 36.9 (14 Nov 2024 22:45)  T(F): 98.1 (15 Nov 2024 04:54), Max: 98.4 (14 Nov 2024 22:45)  HR: 74 (15 Nov 2024 04:00) (74 - 93)  BP: 100/56 (15 Nov 2024 04:00) (91/53 - 120/70)  BP(mean): 72 (15 Nov 2024 04:00) (67 - 80)  RR: 18 (15 Nov 2024 04:00) (11 - 24)  SpO2: 98% (15 Nov 2024 04:00) (95% - 100%)    Parameters below as of 15 Nov 2024 04:00  Patient On (Oxygen Delivery Method): room air        General: NAD, resting comfortably in bed  Pulm: Nonlabored breathing, no respiratory distress  Abd: soft, mildly distended, appropriate post surgical TTP, (-) rebound, (-) guarding   Extrem: WWP, no edema, SCDs in place    LABS:                        8.7    12.11 )-----------( 46       ( 14 Nov 2024 21:04 )             27.7     11-14    139  |  107  |  7   ----------------------------<  175[H]  3.5   |  22  |  0.73    Ca    7.8[L]      14 Nov 2024 21:04  Phos  4.1     11-14  Mg     1.7     11-14        Urinalysis Basic - ( 14 Nov 2024 21:04 )    Color: x / Appearance: x / SG: x / pH: x  Gluc: 175 mg/dL / Ketone: x  / Bili: x / Urobili: x   Blood: x / Protein: x / Nitrite: x   Leuk Esterase: x / RBC: x / WBC x   Sq Epi: x / Non Sq Epi: x / Bacteria: x        RADIOLOGY & ADDITIONAL STUDIES:

## 2024-11-15 NOTE — DISCHARGE NOTE PROVIDER - PROVIDER TOKENS
PROVIDER:[TOKEN:[[id number]],ESTABLISHEDPATIENT:[T]] PROVIDER:[TOKEN:[[id number]],FOLLOWUP:[1 week],ESTABLISHEDPATIENT:[T]]

## 2024-11-15 NOTE — PROGRESS NOTE ADULT - SUBJECTIVE AND OBJECTIVE BOX
Pt seen and examined at bedside. Pt states mild abdominal pain but overall ok. She is tolerating clears without nausea. She is ambulating a little and voided.  Pt denies fever, chills, chest pain, SOB, nausea, vomiting, lightheadedness, dizziness.      T(F): 98.1 (11-15-24 @ 04:54), Max: 98.4 (11-14-24 @ 22:45)  HR: 74 (11-15-24 @ 04:00) (74 - 93)  BP: 100/56 (11-15-24 @ 04:00) (91/53 - 120/70)  RR: 18 (11-15-24 @ 04:00) (11 - 24)  SpO2: 98% (11-15-24 @ 04:00) (95% - 100%)  Wt(kg): --  I&O's Summary    14 Nov 2024 07:01  -  15 Nov 2024 06:59  --------------------------------------------------------  IN: 700 mL / OUT: 280 mL / NET: 420 mL        MEDICATIONS  (STANDING):  heparin   Injectable 5000 Unit(s) SubCutaneous every 8 hours  influenza   Vaccine 0.5 milliLiter(s) IntraMuscular once  lactated ringers. 1000 milliLiter(s) (100 mL/Hr) IV Continuous <Continuous>    MEDICATIONS  (PRN):  acetaminophen     Tablet .. 1000 milliGRAM(s) Oral every 6 hours PRN Mild Pain (1 - 3)  HYDROmorphone  Injectable 0.5 milliGRAM(s) IV Push every 6 hours PRN breakthrough pain in the PACU  ondansetron Injectable 4 milliGRAM(s) IV Push every 6 hours PRN Nausea and/or Vomiting  oxyCODONE    IR 5 milliGRAM(s) Oral every 6 hours PRN Moderate Pain (4 - 6)      Physical Exam:  Constitutional: NAD  Pulm: No increased WOB  Abdomen: incision site clean, dry, intact. Soft, mildly tender, nondistended, no guarding, no rebound, +bowel sounds  Extremities: no lower extremity edema or calf tenderness. SCDs in place     LABS:                        8.7    12.11 )-----------( 46       ( 14 Nov 2024 21:04 )             27.7     11-14    139  |  107  |  7   ----------------------------<  175[H]  3.5   |  22  |  0.73    Ca    7.8[L]      14 Nov 2024 21:04  Phos  4.1     11-14  Mg     1.7     11-14        Urinalysis Basic - ( 14 Nov 2024 21:04 )    Color: x / Appearance: x / SG: x / pH: x  Gluc: 175 mg/dL / Ketone: x  / Bili: x / Urobili: x   Blood: x / Protein: x / Nitrite: x   Leuk Esterase: x / RBC: x / WBC x   Sq Epi: x / Non Sq Epi: x / Bacteria: x

## 2024-11-15 NOTE — DISCHARGE NOTE PROVIDER - NSDCFUADDINST_GEN_ALL_CORE_FT
Please take Tylenol 1000mg every 6 hours for pain. You may alternate every 3 hours with ibuprofen as needed.   Please take oxycodone 5mg every 6 hours as needed for pain. Take colace 100mg daily while taking oxycodone to prevent constipation.    General Discharge Instructions:  Please resume all regular home medications unless specifically advised not to take a particular medication. Also, please take any new medications as prescribed.  Please get plenty of rest, continue to ambulate several times per day, and drink adequate amounts of fluids. Avoid lifting weights greater than 5-10 lbs until you follow-up with your surgeon, who will instruct you further regarding activity restrictions.  Avoid driving or operating heavy machinery while taking pain medications.  Please follow-up with your surgeon and Primary Care Provider (PCP) as advised.  Incision Care:  *Please call your doctor or nurse practitioner if you have increased pain, swelling, redness, or drainage from the incision site.  *Avoid swimming and baths until your follow-up appointment.  *You may shower, and wash surgical incisions with a mild soap and warm water. Gently pat the area dry.  *If you have staples, they will be removed at your follow-up appointment.  *If you have steri-strips, they will fall off on their own. Please remove any remaining strips 7-10 days after surgery.    Warning Signs:  Please call your doctor or nurse practitioner if you experience the following:  *You experience new chest pain, pressure, squeezing or tightness.  *New or worsening cough, shortness of breath, or wheeze.  *If you are vomiting and cannot keep down fluids or your medications.  *You are getting dehydrated due to continued vomiting, diarrhea, or other reasons. Signs of dehydration include dry mouth, rapid heartbeat, or feeling dizzy or faint when standing.  *You see blood or dark/black material when you vomit or have a bowel movement.  *You experience burning when you urinate, have blood in your urine, or experience a discharge.  *Your pain is not improving within 8-12 hours or is not gone within 24 hours. Call or return immediately if your pain is getting worse, changes location, or moves to your chest or back.  *You have shaking chills, or fever greater than 101.5 degrees Fahrenheit or 38 degrees Celsius.  *Any change in your symptoms, or any new symptoms that concern you.   Please take Tylenol 1000mg every 6 hours for pain. You may alternate every 3 hours with ibuprofen as needed.   Please take oxycodone 5mg every 6 hours as needed for pain. Take colace 100mg daily while taking oxycodone to prevent constipation.  Continue taking ferrous sulfate as directed.    General Discharge Instructions:  Please resume all regular home medications unless specifically advised not to take a particular medication. Also, please take any new medications as prescribed.  Please get plenty of rest, continue to ambulate several times per day, and drink adequate amounts of fluids. Avoid lifting weights greater than 5-10 lbs until you follow-up with your surgeon, who will instruct you further regarding activity restrictions.  Avoid driving or operating heavy machinery while taking pain medications.  Please follow-up with your surgeon and Primary Care Provider (PCP) as advised.  Incision Care:  *Please call your doctor or nurse practitioner if you have increased pain, swelling, redness, or drainage from the incision site.  *Avoid swimming and baths until your follow-up appointment.  *You may shower, and wash surgical incisions with a mild soap and warm water. Gently pat the area dry.  *If you have staples, they will be removed at your follow-up appointment.  *If you have steri-strips, they will fall off on their own. Please remove any remaining strips 7-10 days after surgery.    Warning Signs:  Please call your doctor or nurse practitioner if you experience the following:  *You experience new chest pain, pressure, squeezing or tightness.  *New or worsening cough, shortness of breath, or wheeze.  *If you are vomiting and cannot keep down fluids or your medications.  *You are getting dehydrated due to continued vomiting, diarrhea, or other reasons. Signs of dehydration include dry mouth, rapid heartbeat, or feeling dizzy or faint when standing.  *You see blood or dark/black material when you vomit or have a bowel movement.  *You experience burning when you urinate, have blood in your urine, or experience a discharge.  *Your pain is not improving within 8-12 hours or is not gone within 24 hours. Call or return immediately if your pain is getting worse, changes location, or moves to your chest or back.  *You have shaking chills, or fever greater than 101.5 degrees Fahrenheit or 38 degrees Celsius.  *Any change in your symptoms, or any new symptoms that concern you.

## 2024-11-15 NOTE — DISCHARGE NOTE PROVIDER - NSDCMRMEDTOKEN_GEN_ALL_CORE_FT
Motrin 400 mg oral tablet: 1 tab(s) orally every 6 hours, As Needed  Tylenol 500 mg oral tablet: 2 tab(s) orally prn   Colace 100 mg oral capsule: 1 cap(s) orally every 12 hours  ferrous sulfate 325 mg (65 mg elemental iron) oral delayed release tablet: 1 tab(s) orally once a day Take Monday, Wednesday, Friday  Motrin 400 mg oral tablet: 1 tab(s) orally every 6 hours, As Needed  Tylenol 500 mg oral tablet: 2 tab(s) orally prn   acetaminophen 500 mg oral tablet: 2 tab(s) orally every 6 hours As needed Mild Pain (1 - 3)  Colace 100 mg oral capsule: 1 cap(s) orally every 12 hours  ferrous sulfate 325 mg (65 mg elemental iron) oral delayed release tablet: 1 tab(s) orally once a day Take Monday, Wednesday, Friday  Motrin 400 mg oral tablet: 1 tab(s) orally every 6 hours, As Needed  oxyCODONE 5 mg oral tablet: 1 tab(s) orally every 6 hours as needed for Moderate Pain (4 - 6) MDD: 4  pantoprazole 40 mg oral delayed release tablet: 1 tab(s) orally once a day MDD: 1

## 2024-11-15 NOTE — CONSULT NOTE ADULT - SUBJECTIVE AND OBJECTIVE BOX
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 Patient is now s/p procedure, with c/o abdominal pain controlled on current pain regiment, also c/o Right shoulder pain. Pt on tele monitoring due to blood loss intra op with post op normocytic anemia. Patient with sinus tachycardia, mildly low BPs. Pt denies chest pain / palpiatations / fevers / chills / N /V.     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 (14 Nov 2024 15:11)        PAST MEDICAL & SURGICAL HISTORY:  Fibroids  uterine      Anal fistula      Endometriosis      Ovarian cyst      Perianal abscess      H/O ovarian cystectomy  left      Anal fistula  surgery          Allergies    No Known Allergies    Intolerances        MEDICATIONS  (STANDING):  heparin   Injectable 5000 Unit(s) SubCutaneous every 8 hours  influenza   Vaccine 0.5 milliLiter(s) IntraMuscular once  lactated ringers. 1000 milliLiter(s) (100 mL/Hr) IV Continuous <Continuous>    MEDICATIONS  (PRN):  acetaminophen     Tablet .. 1000 milliGRAM(s) Oral every 6 hours PRN Mild Pain (1 - 3)  HYDROmorphone  Injectable 0.5 milliGRAM(s) IV Push every 6 hours PRN breakthrough pain in the PACU  ondansetron Injectable 4 milliGRAM(s) IV Push every 6 hours PRN Nausea and/or Vomiting  oxyCODONE    IR 5 milliGRAM(s) Oral every 6 hours PRN Moderate Pain (4 - 6)      SOCIAL HISTORY:    FAMILY HISTORY:  Family history of essential hypertension (Father)          Vital Signs Last 24 Hrs  T(C): 36.7 (15 Nov 2024 08:56), Max: 36.9 (14 Nov 2024 22:45)  T(F): 98.1 (15 Nov 2024 08:56), Max: 98.4 (14 Nov 2024 22:45)  HR: 86 (15 Nov 2024 08:27) (74 - 93)  BP: 99/54 (15 Nov 2024 08:27) (91/53 - 120/70)  BP(mean): 72 (15 Nov 2024 08:27) (67 - 80)  RR: 18 (15 Nov 2024 08:27) (11 - 24)  SpO2: 98% (15 Nov 2024 08:27) (95% - 100%)    Parameters below as of 15 Nov 2024 08:27  Patient On (Oxygen Delivery Method): room air        I&O's Summary    14 Nov 2024 07:01  -  15 Nov 2024 07:00  --------------------------------------------------------  IN: 700 mL / OUT: 280 mL / NET: 420 mL        LABS:                        7.7    8.20  )-----------( 61       ( 15 Nov 2024 07:18 )             25.0     11-15    142  |  110[H]  |  6[L]  ----------------------------<  122[H]  3.6   |  23  |  0.69    Ca    7.8[L]      15 Nov 2024 07:18  Phos  3.7     11-15  Mg     2.0     11-15          Urinalysis Basic - ( 15 Nov 2024 07:18 )    Color: x / Appearance: x / SG: x / pH: x  Gluc: 122 mg/dL / Ketone: x  / Bili: x / Urobili: x   Blood: x / Protein: x / Nitrite: x   Leuk Esterase: x / RBC: x / WBC x   Sq Epi: x / Non Sq Epi: x / Bacteria: x      CAPILLARY BLOOD GLUCOSE      POCT Blood Glucose.: 89 mg/dL (14 Nov 2024 14:34)      Cultures:      PHYSICAL EXAM:  General: NAD, resting comfortably  HEENT: NC/AT, EOMI, normal hearing, no oral lesions, no LAD, neck supple  Pulmonary: Normal resp effort, CTA-B  Cardiovascular: NSR, tachycardia   Abdominal: Soft, ND, dressings CDI, post op tenderness, no organomegaly  Extremities: (+) DP/PT pulses. FROM, normal strength, no clubbing/cyanosis/erythema/edema  Neuro: A/O x 3, CNs II-XII grossly intact, normal sensation, no focal deficits  Pulses: Palpable distal pulses    RADIOLOGY & ADDITIONAL STUDIES:      ASSESSMENT:      PLAN:

## 2024-11-16 LAB
ANION GAP SERPL CALC-SCNC: 7 MMOL/L — SIGNIFICANT CHANGE UP (ref 5–17)
BUN SERPL-MCNC: 7 MG/DL — SIGNIFICANT CHANGE UP (ref 7–23)
CALCIUM SERPL-MCNC: 8.2 MG/DL — LOW (ref 8.4–10.5)
CHLORIDE SERPL-SCNC: 107 MMOL/L — SIGNIFICANT CHANGE UP (ref 96–108)
CO2 SERPL-SCNC: 26 MMOL/L — SIGNIFICANT CHANGE UP (ref 22–31)
CREAT SERPL-MCNC: 0.71 MG/DL — SIGNIFICANT CHANGE UP (ref 0.5–1.3)
EGFR: 112 ML/MIN/1.73M2 — SIGNIFICANT CHANGE UP
GLUCOSE SERPL-MCNC: 126 MG/DL — HIGH (ref 70–99)
HCT VFR BLD CALC: 25.6 % — LOW (ref 34.5–45)
HGB BLD-MCNC: 7.8 G/DL — LOW (ref 11.5–15.5)
MAGNESIUM SERPL-MCNC: 2.1 MG/DL — SIGNIFICANT CHANGE UP (ref 1.6–2.6)
MCHC RBC-ENTMCNC: 26.9 PG — LOW (ref 27–34)
MCHC RBC-ENTMCNC: 30.5 G/DL — LOW (ref 32–36)
MCV RBC AUTO: 88.3 FL — SIGNIFICANT CHANGE UP (ref 80–100)
NRBC # BLD: 0 /100 WBCS — SIGNIFICANT CHANGE UP (ref 0–0)
PHOSPHATE SERPL-MCNC: 2.3 MG/DL — LOW (ref 2.5–4.5)
PLATELET # BLD AUTO: 173 K/UL — SIGNIFICANT CHANGE UP (ref 150–400)
POTASSIUM SERPL-MCNC: 3.4 MMOL/L — LOW (ref 3.5–5.3)
POTASSIUM SERPL-SCNC: 3.4 MMOL/L — LOW (ref 3.5–5.3)
RBC # BLD: 2.9 M/UL — LOW (ref 3.8–5.2)
RBC # FLD: 14.3 % — SIGNIFICANT CHANGE UP (ref 10.3–14.5)
SODIUM SERPL-SCNC: 140 MMOL/L — SIGNIFICANT CHANGE UP (ref 135–145)
WBC # BLD: 11.37 K/UL — HIGH (ref 3.8–10.5)
WBC # FLD AUTO: 11.37 K/UL — HIGH (ref 3.8–10.5)

## 2024-11-16 RX ORDER — POTASSIUM CHLORIDE 600 MG/1
40 TABLET, EXTENDED RELEASE ORAL ONCE
Refills: 0 | Status: COMPLETED | OUTPATIENT
Start: 2024-11-16 | End: 2024-11-16

## 2024-11-16 RX ORDER — POTASSIUM PHOSPHATE, MONOBASIC POTASSIUM PHOSPHATE, DIBASIC INJECTION, 236; 224 MG/ML; MG/ML
15 SOLUTION, CONCENTRATE INTRAVENOUS ONCE
Refills: 0 | Status: COMPLETED | OUTPATIENT
Start: 2024-11-16 | End: 2024-11-16

## 2024-11-16 RX ORDER — PANTOPRAZOLE SODIUM 40 MG/1
40 TABLET, DELAYED RELEASE ORAL EVERY 24 HOURS
Refills: 0 | Status: DISCONTINUED | OUTPATIENT
Start: 2024-11-16 | End: 2024-11-19

## 2024-11-16 RX ADMIN — PANTOPRAZOLE SODIUM 40 MILLIGRAM(S): 40 TABLET, DELAYED RELEASE ORAL at 22:46

## 2024-11-16 RX ADMIN — POTASSIUM CHLORIDE 40 MILLIEQUIVALENT(S): 600 TABLET, EXTENDED RELEASE ORAL at 11:56

## 2024-11-16 RX ADMIN — POTASSIUM PHOSPHATE, MONOBASIC POTASSIUM PHOSPHATE, DIBASIC INJECTION, 62.5 MILLIMOLE(S): 236; 224 SOLUTION, CONCENTRATE INTRAVENOUS at 11:55

## 2024-11-16 RX ADMIN — OXYCODONE HYDROCHLORIDE 5 MILLIGRAM(S): 30 TABLET ORAL at 12:59

## 2024-11-16 RX ADMIN — OXYCODONE HYDROCHLORIDE 5 MILLIGRAM(S): 30 TABLET ORAL at 02:12

## 2024-11-16 RX ADMIN — OXYCODONE HYDROCHLORIDE 5 MILLIGRAM(S): 30 TABLET ORAL at 12:29

## 2024-11-16 RX ADMIN — OXYCODONE HYDROCHLORIDE 5 MILLIGRAM(S): 30 TABLET ORAL at 22:46

## 2024-11-16 NOTE — PROGRESS NOTE ADULT - SUBJECTIVE AND OBJECTIVE BOX
INTERVAL HPI/OVERNIGHT EVENTS:    STATUS POST:      POST OPERATIVE DAY #:     SUBJECTIVE: Pt seen and examined at bedside this am by surgery team. Tolerating diet, pain well controlled. Denies f/n/v/cp/sob.    MEDICATIONS  (STANDING):  heparin   Injectable 5000 Unit(s) SubCutaneous every 8 hours  influenza   Vaccine 0.5 milliLiter(s) IntraMuscular once  lactated ringers. 1000 milliLiter(s) (100 mL/Hr) IV Continuous <Continuous>  potassium chloride    Tablet ER 40 milliEquivalent(s) Oral once  potassium phosphate IVPB 15 milliMole(s) IV Intermittent once    MEDICATIONS  (PRN):  acetaminophen     Tablet .. 1000 milliGRAM(s) Oral every 6 hours PRN Mild Pain (1 - 3)  ondansetron Injectable 4 milliGRAM(s) IV Push every 6 hours PRN Nausea and/or Vomiting  oxyCODONE    IR 5 milliGRAM(s) Oral every 6 hours PRN Moderate Pain (4 - 6)      Vital Signs Last 24 Hrs  T(C): 36.8 (16 Nov 2024 09:00), Max: 36.9 (15 Nov 2024 18:01)  T(F): 98.3 (16 Nov 2024 09:00), Max: 98.5 (15 Nov 2024 18:01)  HR: 94 (16 Nov 2024 05:46) (92 - 102)  BP: 98/53 (16 Nov 2024 05:46) (97/54 - 110/55)  BP(mean): 68 (16 Nov 2024 05:46) (68 - 73)  RR: 18 (15 Nov 2024 18:19) (18 - 18)  SpO2: 97% (16 Nov 2024 05:46) (97% - 98%)    Parameters below as of 16 Nov 2024 05:46  Patient On (Oxygen Delivery Method): room air        Physical Exam:  Constitutional: A&Ox3, NAD  Respiratory: normal work of breathing  Cardiovascular: NSR, RRR  Abdomen: soft, non-tender, non distended, no rebound or gaurding  Incision:  Ostomy:  Genitourinary:  Legs: WWP, SCDs in place, no calf tenderness        I&O's Detail    15 Nov 2024 07:01  -  16 Nov 2024 07:00  --------------------------------------------------------  IN:  Total IN: 0 mL    OUT:    Voided (mL): 700 mL  Total OUT: 700 mL    Total NET: -700 mL          LABS:                        7.8    11.37 )-----------( 173      ( 16 Nov 2024 07:27 )             25.6     11-16    140  |  107  |  7   ----------------------------<  126[H]  3.4[L]   |  26  |  0.71    Ca    8.2[L]      16 Nov 2024 07:27  Phos  2.3     11-16  Mg     2.1     11-16        Urinalysis Basic - ( 16 Nov 2024 07:27 )    Color: x / Appearance: x / SG: x / pH: x  Gluc: 126 mg/dL / Ketone: x  / Bili: x / Urobili: x   Blood: x / Protein: x / Nitrite: x   Leuk Esterase: x / RBC: x / WBC x   Sq Epi: x / Non Sq Epi: x / Bacteria: x        RADIOLOGY & ADDITIONAL STUDIES: INTERVAL HPI/OVERNIGHT EVENTS: had an episode of small volume emesis    STATUS POST:  RA lap endometriosis excision, uterine myomectomy, cyst fenestrations, rectal seromuscular repair, and flexible sigmoidoscopy     POST OPERATIVE DAY #: 2    SUBJECTIVE: Pt seen and examined at bedside this am by surgery team. She is tolerating a minimal amount of clear liquids. She is not passing flatus or having bowel movements. She has some nausea and vomiting but responds well to Zofran. She is gettting OOB.    MEDICATIONS  (STANDING):  heparin   Injectable 5000 Unit(s) SubCutaneous every 8 hours  influenza   Vaccine 0.5 milliLiter(s) IntraMuscular once  lactated ringers. 1000 milliLiter(s) (100 mL/Hr) IV Continuous <Continuous>  potassium chloride    Tablet ER 40 milliEquivalent(s) Oral once  potassium phosphate IVPB 15 milliMole(s) IV Intermittent once    MEDICATIONS  (PRN):  acetaminophen     Tablet .. 1000 milliGRAM(s) Oral every 6 hours PRN Mild Pain (1 - 3)  ondansetron Injectable 4 milliGRAM(s) IV Push every 6 hours PRN Nausea and/or Vomiting  oxyCODONE    IR 5 milliGRAM(s) Oral every 6 hours PRN Moderate Pain (4 - 6)      Vital Signs Last 24 Hrs  T(C): 36.8 (16 Nov 2024 09:00), Max: 36.9 (15 Nov 2024 18:01)  T(F): 98.3 (16 Nov 2024 09:00), Max: 98.5 (15 Nov 2024 18:01)  HR: 94 (16 Nov 2024 05:46) (92 - 102)  BP: 98/53 (16 Nov 2024 05:46) (97/54 - 110/55)  BP(mean): 68 (16 Nov 2024 05:46) (68 - 73)  RR: 18 (15 Nov 2024 18:19) (18 - 18)  SpO2: 97% (16 Nov 2024 05:46) (97% - 98%)    Parameters below as of 16 Nov 2024 05:46  Patient On (Oxygen Delivery Method): room air        Physical Exam:  Constitutional: A&Ox3, NAD  Respiratory: normal work of breathing  Cardiovascular: NSR, RRR  Abdomen: soft, non-tender, non distended, no rebound or gaurding  Legs: WWP, SCDs in place, no calf tenderness        I&O's Detail    15 Nov 2024 07:01  -  16 Nov 2024 07:00  --------------------------------------------------------  IN:  Total IN: 0 mL    OUT:    Voided (mL): 700 mL  Total OUT: 700 mL    Total NET: -700 mL          LABS:                        7.8    11.37 )-----------( 173      ( 16 Nov 2024 07:27 )             25.6     11-16    140  |  107  |  7   ----------------------------<  126[H]  3.4[L]   |  26  |  0.71    Ca    8.2[L]      16 Nov 2024 07:27  Phos  2.3     11-16  Mg     2.1     11-16        Urinalysis Basic - ( 16 Nov 2024 07:27 )    Color: x / Appearance: x / SG: x / pH: x  Gluc: 126 mg/dL / Ketone: x  / Bili: x / Urobili: x   Blood: x / Protein: x / Nitrite: x   Leuk Esterase: x / RBC: x / WBC x   Sq Epi: x / Non Sq Epi: x / Bacteria: x        RADIOLOGY & ADDITIONAL STUDIES:

## 2024-11-16 NOTE — PROGRESS NOTE ADULT - SUBJECTIVE AND OBJECTIVE BOX
Patient seen and examined at bedside. Patient states mild abdominal pain. Pt ambulating, tolerating liquid diet, passing flatus, not yet bowel movement, urinating adequately.   Patient denies fever, chills, chest pain, shortness of breath, nausea, vomiting, lightheadedness, dizziness.      T(F): 98.1 (11-16-24 @ 05:14), Max: 98.5 (11-15-24 @ 18:01)  HR: 94 (11-16-24 @ 05:46) (92 - 102)  BP: 98/53 (11-16-24 @ 05:46) (97/54 - 110/55)  RR: 18 (11-15-24 @ 18:19) (18 - 18)  SpO2: 97% (11-16-24 @ 05:46) (97% - 98%)  Wt(kg): --  I&O's Summary    15 Nov 2024 07:01  -  16 Nov 2024 07:00  --------------------------------------------------------  IN: 0 mL / OUT: 700 mL / NET: -700 mL        MEDICATIONS  (STANDING):  heparin   Injectable 5000 Unit(s) SubCutaneous every 8 hours  influenza   Vaccine 0.5 milliLiter(s) IntraMuscular once  lactated ringers. 1000 milliLiter(s) (100 mL/Hr) IV Continuous <Continuous>    MEDICATIONS  (PRN):  acetaminophen     Tablet .. 1000 milliGRAM(s) Oral every 6 hours PRN Mild Pain (1 - 3)  ondansetron Injectable 4 milliGRAM(s) IV Push every 6 hours PRN Nausea and/or Vomiting  oxyCODONE    IR 5 milliGRAM(s) Oral every 6 hours PRN Moderate Pain (4 - 6)      Physical Exam:  Constitutional: no apparent distress  Pulmonary: no increased work of breathing  Abdomen: incision sites clean, dry, intact; soft, mildly tender, mildly distended, voluntary guarding, no rebound, normoactive bowel sounds  Extremities: no lower extremity edema or calf tenderness bilaterally; SCDs in place bilaterally    LABS:                        7.8    11.37 )-----------( 173      ( 16 Nov 2024 07:27 )             25.6     11-16    140  |  107  |  7   ----------------------------<  126[H]  3.4[L]   |  26  |  0.71    Ca    8.2[L]      16 Nov 2024 07:27  Phos  2.3     11-16  Mg     2.1     11-16        Urinalysis Basic - ( 16 Nov 2024 07:27 )    Color: x / Appearance: x / SG: x / pH: x  Gluc: 126 mg/dL / Ketone: x  / Bili: x / Urobili: x   Blood: x / Protein: x / Nitrite: x   Leuk Esterase: x / RBC: x / WBC x   Sq Epi: x / Non Sq Epi: x / Bacteria: x

## 2024-11-16 NOTE — PROGRESS NOTE ADULT - SUBJECTIVE AND OBJECTIVE BOX
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 Patient is now s/p procedure, with c/o abdominal pain controlled on current pain regiment, also c/o Right shoulder pain. Pt on tele monitoring due to blood loss intra op with post op normocytic anemia. Patient with sinus tachycardia, mildly low BPs. Pt denies chest pain / palpiatations / fevers / chills / N /V.     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 (14 Nov 2024 15:11)        PAST MEDICAL & SURGICAL HISTORY:  Fibroids  uterine      Anal fistula      Endometriosis      Ovarian cyst      Perianal abscess      H/O ovarian cystectomy  left      Anal fistula  surgery          Allergies    No Known Allergies    Intolerances        MEDICATIONS  (STANDING):  heparin   Injectable 5000 Unit(s) SubCutaneous every 8 hours  influenza   Vaccine 0.5 milliLiter(s) IntraMuscular once  lactated ringers. 1000 milliLiter(s) (100 mL/Hr) IV Continuous <Continuous>    MEDICATIONS  (PRN):  acetaminophen     Tablet .. 1000 milliGRAM(s) Oral every 6 hours PRN Mild Pain (1 - 3)  HYDROmorphone  Injectable 0.5 milliGRAM(s) IV Push every 6 hours PRN breakthrough pain in the PACU  ondansetron Injectable 4 milliGRAM(s) IV Push every 6 hours PRN Nausea and/or Vomiting  oxyCODONE    IR 5 milliGRAM(s) Oral every 6 hours PRN Moderate Pain (4 - 6)      SOCIAL HISTORY:    FAMILY HISTORY:  Family history of essential hypertension (Father)      Vital Signs Last 24 Hrs  T(C): 36.8 (16 Nov 2024 09:00), Max: 36.9 (15 Nov 2024 18:01)  T(F): 98.3 (16 Nov 2024 09:00), Max: 98.5 (15 Nov 2024 18:01)  HR: 100 (16 Nov 2024 08:45) (92 - 102)  BP: 112/73 (16 Nov 2024 08:45) (97/54 - 112/73)  BP(mean): 86 (16 Nov 2024 08:45) (68 - 86)  RR: 18 (16 Nov 2024 08:45) (18 - 18)  SpO2: 96% (16 Nov 2024 08:45) (96% - 98%)    Parameters below as of 16 Nov 2024 08:45  Patient On (Oxygen Delivery Method): room air            I&O's Summary    14 Nov 2024 07:01  -  15 Nov 2024 07:00  --------------------------------------------------------  IN: 700 mL / OUT: 280 mL / NET: 420 mL        LABS:                        7.7    8.20  )-----------( 61       ( 15 Nov 2024 07:18 )             25.0     11-15    142  |  110[H]  |  6[L]  ----------------------------<  122[H]  3.6   |  23  |  0.69    Ca    7.8[L]      15 Nov 2024 07:18  Phos  3.7     11-15  Mg     2.0     11-15          Urinalysis Basic - ( 15 Nov 2024 07:18 )    Color: x / Appearance: x / SG: x / pH: x  Gluc: 122 mg/dL / Ketone: x  / Bili: x / Urobili: x   Blood: x / Protein: x / Nitrite: x   Leuk Esterase: x / RBC: x / WBC x   Sq Epi: x / Non Sq Epi: x / Bacteria: x      CAPILLARY BLOOD GLUCOSE      POCT Blood Glucose.: 89 mg/dL (14 Nov 2024 14:34)      Cultures:      PHYSICAL EXAM:  General: NAD, resting comfortably  HEENT: NC/AT, EOMI, normal hearing, no oral lesions, no LAD, neck supple  Pulmonary: Normal resp effort, CTA-B  Cardiovascular: NSR, tachycardia   Abdominal: Soft, ND, dressings CDI, post op tenderness, no organomegaly  Extremities: (+) DP/PT pulses. FROM, normal strength, no clubbing/cyanosis/erythema/edema  Neuro: A/O x 3, CNs II-XII grossly intact, normal sensation, no focal deficits  Pulses: Palpable distal pulses    RADIOLOGY & ADDITIONAL STUDIES:      ASSESSMENT:      PLAN:

## 2024-11-16 NOTE — PROGRESS NOTE ADULT - ASSESSMENT
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     CLD/IVF  Pain/nausea control prn  Doesnt take any home meds  SCDs, SQH  AM labs

## 2024-11-16 NOTE — PROGRESS NOTE ADULT - ASSESSMENT
POD2    38 y/o s/p RA endo excision, myomectomy, adenomyomectomy, b/l cystectomy, LS, RUSSELL, sigmoidoscopy, rectal serosal oversew by gen surg, cysto (EBL 1000)     Neuro: percocet PRN   CV: VSS   Pulm: RA   GI: CLD, LR 100cc/hr, miralax, zofran, -/-   : s/p israel, voiding   GYN: s/p RA endo excision, myomectomy   DVT ppx: SCDs, SQH

## 2024-11-17 LAB
ANION GAP SERPL CALC-SCNC: 7 MMOL/L — SIGNIFICANT CHANGE UP (ref 5–17)
ANION GAP SERPL CALC-SCNC: 9 MMOL/L — SIGNIFICANT CHANGE UP (ref 5–17)
BLD GP AB SCN SERPL QL: NEGATIVE — SIGNIFICANT CHANGE UP
BUN SERPL-MCNC: 4 MG/DL — LOW (ref 7–23)
BUN SERPL-MCNC: 4 MG/DL — LOW (ref 7–23)
CALCIUM SERPL-MCNC: 7.7 MG/DL — LOW (ref 8.4–10.5)
CALCIUM SERPL-MCNC: 8 MG/DL — LOW (ref 8.4–10.5)
CHLORIDE SERPL-SCNC: 106 MMOL/L — SIGNIFICANT CHANGE UP (ref 96–108)
CHLORIDE SERPL-SCNC: 108 MMOL/L — SIGNIFICANT CHANGE UP (ref 96–108)
CO2 SERPL-SCNC: 24 MMOL/L — SIGNIFICANT CHANGE UP (ref 22–31)
CO2 SERPL-SCNC: 25 MMOL/L — SIGNIFICANT CHANGE UP (ref 22–31)
CREAT SERPL-MCNC: 0.57 MG/DL — SIGNIFICANT CHANGE UP (ref 0.5–1.3)
CREAT SERPL-MCNC: 0.58 MG/DL — SIGNIFICANT CHANGE UP (ref 0.5–1.3)
EGFR: 119 ML/MIN/1.73M2 — SIGNIFICANT CHANGE UP
EGFR: 120 ML/MIN/1.73M2 — SIGNIFICANT CHANGE UP
GLUCOSE SERPL-MCNC: 100 MG/DL — HIGH (ref 70–99)
GLUCOSE SERPL-MCNC: 106 MG/DL — HIGH (ref 70–99)
HCT VFR BLD CALC: 20.2 % — CRITICAL LOW (ref 34.5–45)
HCT VFR BLD CALC: 21.1 % — LOW (ref 34.5–45)
HCT VFR BLD CALC: 24.1 % — LOW (ref 34.5–45)
HCT VFR BLD CALC: 25.5 % — LOW (ref 34.5–45)
HGB BLD-MCNC: 6.3 G/DL — CRITICAL LOW (ref 11.5–15.5)
HGB BLD-MCNC: 6.6 G/DL — CRITICAL LOW (ref 11.5–15.5)
HGB BLD-MCNC: 7.6 G/DL — LOW (ref 11.5–15.5)
HGB BLD-MCNC: 8 G/DL — LOW (ref 11.5–15.5)
MAGNESIUM SERPL-MCNC: 1.8 MG/DL — SIGNIFICANT CHANGE UP (ref 1.6–2.6)
MCHC RBC-ENTMCNC: 27 PG — SIGNIFICANT CHANGE UP (ref 27–34)
MCHC RBC-ENTMCNC: 27 PG — SIGNIFICANT CHANGE UP (ref 27–34)
MCHC RBC-ENTMCNC: 27.1 PG — SIGNIFICANT CHANGE UP (ref 27–34)
MCHC RBC-ENTMCNC: 27.8 PG — SIGNIFICANT CHANGE UP (ref 27–34)
MCHC RBC-ENTMCNC: 31.2 G/DL — LOW (ref 32–36)
MCHC RBC-ENTMCNC: 31.3 G/DL — LOW (ref 32–36)
MCHC RBC-ENTMCNC: 31.4 G/DL — LOW (ref 32–36)
MCHC RBC-ENTMCNC: 31.5 G/DL — LOW (ref 32–36)
MCV RBC AUTO: 86.1 FL — SIGNIFICANT CHANGE UP (ref 80–100)
MCV RBC AUTO: 86.1 FL — SIGNIFICANT CHANGE UP (ref 80–100)
MCV RBC AUTO: 86.5 FL — SIGNIFICANT CHANGE UP (ref 80–100)
MCV RBC AUTO: 89 FL — SIGNIFICANT CHANGE UP (ref 80–100)
NRBC # BLD: 0 /100 WBCS — SIGNIFICANT CHANGE UP (ref 0–0)
PHOSPHATE SERPL-MCNC: 2 MG/DL — LOW (ref 2.5–4.5)
PLATELET # BLD AUTO: 118 K/UL — LOW (ref 150–400)
PLATELET # BLD AUTO: 122 K/UL — LOW (ref 150–400)
PLATELET # BLD AUTO: 135 K/UL — LOW (ref 150–400)
PLATELET # BLD AUTO: 169 K/UL — SIGNIFICANT CHANGE UP (ref 150–400)
POTASSIUM SERPL-MCNC: 3.6 MMOL/L — SIGNIFICANT CHANGE UP (ref 3.5–5.3)
POTASSIUM SERPL-MCNC: 3.6 MMOL/L — SIGNIFICANT CHANGE UP (ref 3.5–5.3)
POTASSIUM SERPL-SCNC: 3.6 MMOL/L — SIGNIFICANT CHANGE UP (ref 3.5–5.3)
POTASSIUM SERPL-SCNC: 3.6 MMOL/L — SIGNIFICANT CHANGE UP (ref 3.5–5.3)
RBC # BLD: 2.27 M/UL — LOW (ref 3.8–5.2)
RBC # BLD: 2.44 M/UL — LOW (ref 3.8–5.2)
RBC # BLD: 2.8 M/UL — LOW (ref 3.8–5.2)
RBC # BLD: 2.96 M/UL — LOW (ref 3.8–5.2)
RBC # FLD: 14.1 % — SIGNIFICANT CHANGE UP (ref 10.3–14.5)
RBC # FLD: 14.3 % — SIGNIFICANT CHANGE UP (ref 10.3–14.5)
RBC # FLD: 14.4 % — SIGNIFICANT CHANGE UP (ref 10.3–14.5)
RBC # FLD: 14.6 % — HIGH (ref 10.3–14.5)
RH IG SCN BLD-IMP: POSITIVE — SIGNIFICANT CHANGE UP
SODIUM SERPL-SCNC: 139 MMOL/L — SIGNIFICANT CHANGE UP (ref 135–145)
SODIUM SERPL-SCNC: 140 MMOL/L — SIGNIFICANT CHANGE UP (ref 135–145)
WBC # BLD: 7.82 K/UL — SIGNIFICANT CHANGE UP (ref 3.8–10.5)
WBC # BLD: 8.28 K/UL — SIGNIFICANT CHANGE UP (ref 3.8–10.5)
WBC # BLD: 8.32 K/UL — SIGNIFICANT CHANGE UP (ref 3.8–10.5)
WBC # BLD: 9.07 K/UL — SIGNIFICANT CHANGE UP (ref 3.8–10.5)
WBC # FLD AUTO: 7.82 K/UL — SIGNIFICANT CHANGE UP (ref 3.8–10.5)
WBC # FLD AUTO: 8.28 K/UL — SIGNIFICANT CHANGE UP (ref 3.8–10.5)
WBC # FLD AUTO: 8.32 K/UL — SIGNIFICANT CHANGE UP (ref 3.8–10.5)
WBC # FLD AUTO: 9.07 K/UL — SIGNIFICANT CHANGE UP (ref 3.8–10.5)

## 2024-11-17 RX ORDER — POTASSIUM PHOSPHATE, MONOBASIC POTASSIUM PHOSPHATE, DIBASIC INJECTION, 236; 224 MG/ML; MG/ML
15 SOLUTION, CONCENTRATE INTRAVENOUS ONCE
Refills: 0 | Status: COMPLETED | OUTPATIENT
Start: 2024-11-17 | End: 2024-11-17

## 2024-11-17 RX ORDER — POTASSIUM CHLORIDE 600 MG/1
40 TABLET, EXTENDED RELEASE ORAL ONCE
Refills: 0 | Status: COMPLETED | OUTPATIENT
Start: 2024-11-17 | End: 2024-11-17

## 2024-11-17 RX ADMIN — PANTOPRAZOLE SODIUM 40 MILLIGRAM(S): 40 TABLET, DELAYED RELEASE ORAL at 20:50

## 2024-11-17 RX ADMIN — POTASSIUM CHLORIDE 40 MILLIEQUIVALENT(S): 600 TABLET, EXTENDED RELEASE ORAL at 09:45

## 2024-11-17 RX ADMIN — POTASSIUM PHOSPHATE, MONOBASIC POTASSIUM PHOSPHATE, DIBASIC INJECTION, 62.5 MILLIMOLE(S): 236; 224 SOLUTION, CONCENTRATE INTRAVENOUS at 17:14

## 2024-11-17 NOTE — PROGRESS NOTE ADULT - SUBJECTIVE AND OBJECTIVE BOX
INTERVAL HPI/OVERNIGHT EVENTS: AM hgb 6.3, repeat hgb 6.6    STATUS POST:  : RA lap endometriosis excision, uterine myomectomy, cyst fenestrations, rectal seromuscular repair, and flexible sigmoidoscopy     POST OPERATIVE DAY #: 3    SUBJECTIVE: Pt seen and examined by chief resident. Hgb low this AM but asymptomatic, VSS. Pt is feeling well, resting comfortably on bed. Pain controlled. Up OOB. Gloria small amounts of liquids. Flatus ON but no BM. Nausea controlled with zofran. No complaints at this time.      MEDICATIONS  (STANDING):  heparin   Injectable 5000 Unit(s) SubCutaneous every 8 hours  influenza   Vaccine 0.5 milliLiter(s) IntraMuscular once  lactated ringers. 1000 milliLiter(s) (100 mL/Hr) IV Continuous <Continuous>  pantoprazole  Injectable 40 milliGRAM(s) IV Push every 24 hours    MEDICATIONS  (PRN):  acetaminophen     Tablet .. 1000 milliGRAM(s) Oral every 6 hours PRN Mild Pain (1 - 3)  ondansetron Injectable 4 milliGRAM(s) IV Push every 6 hours PRN Nausea and/or Vomiting  oxyCODONE    IR 5 milliGRAM(s) Oral every 6 hours PRN Moderate Pain (4 - 6)      Vital Signs Last 24 Hrs  T(C): 37.3 (17 Nov 2024 05:11), Max: 37.3 (17 Nov 2024 05:11)  T(F): 99.1 (17 Nov 2024 05:11), Max: 99.1 (17 Nov 2024 05:11)  HR: 82 (17 Nov 2024 03:40) (82 - 106)  BP: 110/61 (17 Nov 2024 03:40) (97/53 - 112/73)  BP(mean): 77 (17 Nov 2024 03:40) (69 - 86)  RR: 17 (17 Nov 2024 03:40) (17 - 18)  SpO2: 97% (17 Nov 2024 03:40) (95% - 99%)    Parameters below as of 17 Nov 2024 03:40  Patient On (Oxygen Delivery Method): room air        PHYSICAL EXAM:  Constitutional: A&Ox3, NAD  Respiratory: normal work of breathing  Cardiovascular: NSR, RRR  Abdomen: soft, non-tender, non distended, no rebound or gaurding  Legs: WWP, SCDs in place, no calf tenderness                I&O's Detail    16 Nov 2024 07:01  -  17 Nov 2024 07:00  --------------------------------------------------------  IN:  Total IN: 0 mL    OUT:    Voided (mL): 1500 mL  Total OUT: 1500 mL    Total NET: -1500 mL          LABS:                        6.6    9.07  )-----------( 169      ( 17 Nov 2024 07:39 )             21.1     11-17    140  |  106  |  4[L]  ----------------------------<  100[H]  3.6   |  25  |  0.58    Ca    8.0[L]      17 Nov 2024 07:39  Phos  2.0     11-17  Mg     1.8     11-17        Urinalysis Basic - ( 17 Nov 2024 07:39 )    Color: x / Appearance: x / SG: x / pH: x  Gluc: 100 mg/dL / Ketone: x  / Bili: x / Urobili: x   Blood: x / Protein: x / Nitrite: x   Leuk Esterase: x / RBC: x / WBC x   Sq Epi: x / Non Sq Epi: x / Bacteria: x        RADIOLOGY & ADDITIONAL STUDIES:    38yo Female pt with 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 Will consider transfusion for low Hgb this morning, but pt is mentating well with benign exam and stable VS. Will continue to monitor for signs of bleeding.     CLD/IVF  Pain/nausea control prn  Doesnt take any home meds  SCDs, SQH (pause?)  AM labs     **incomplete** INTERVAL HPI/OVERNIGHT EVENTS: AM hgb 6.3, repeat hgb 6.6    STATUS POST:  : RA lap endometriosis excision, uterine myomectomy, cyst fenestrations, rectal seromuscular repair, and flexible sigmoidoscopy     POST OPERATIVE DAY #: 3    SUBJECTIVE: Pt seen and examined by chief resident. Hgb low this AM but asymptomatic, VSS. Pt is feeling well, resting comfortably on bed. Pain controlled. Up OOB consistently. Gloria small amounts of liquids. Flatus ON but no BM. Nausea controlled with zofran. No complaints at this time.      MEDICATIONS  (STANDING):  heparin   Injectable 5000 Unit(s) SubCutaneous every 8 hours  influenza   Vaccine 0.5 milliLiter(s) IntraMuscular once  lactated ringers. 1000 milliLiter(s) (100 mL/Hr) IV Continuous <Continuous>  pantoprazole  Injectable 40 milliGRAM(s) IV Push every 24 hours    MEDICATIONS  (PRN):  acetaminophen     Tablet .. 1000 milliGRAM(s) Oral every 6 hours PRN Mild Pain (1 - 3)  ondansetron Injectable 4 milliGRAM(s) IV Push every 6 hours PRN Nausea and/or Vomiting  oxyCODONE    IR 5 milliGRAM(s) Oral every 6 hours PRN Moderate Pain (4 - 6)      Vital Signs Last 24 Hrs  T(C): 37.3 (17 Nov 2024 05:11), Max: 37.3 (17 Nov 2024 05:11)  T(F): 99.1 (17 Nov 2024 05:11), Max: 99.1 (17 Nov 2024 05:11)  HR: 82 (17 Nov 2024 03:40) (82 - 106)  BP: 110/61 (17 Nov 2024 03:40) (97/53 - 112/73)  BP(mean): 77 (17 Nov 2024 03:40) (69 - 86)  RR: 17 (17 Nov 2024 03:40) (17 - 18)  SpO2: 97% (17 Nov 2024 03:40) (95% - 99%)    Parameters below as of 17 Nov 2024 03:40  Patient On (Oxygen Delivery Method): room air        PHYSICAL EXAM:  Constitutional: A&Ox3, NAD  Respiratory: normal work of breathing  Cardiovascular: NSR, RRR  Abdomen: soft, non-tender, non distended, no rebound or gaurding  Legs: WWP, SCDs in place, no calf tenderness                I&O's Detail    16 Nov 2024 07:01  -  17 Nov 2024 07:00  --------------------------------------------------------  IN:  Total IN: 0 mL    OUT:    Voided (mL): 1500 mL  Total OUT: 1500 mL    Total NET: -1500 mL          LABS:                        6.6    9.07  )-----------( 169      ( 17 Nov 2024 07:39 )             21.1     11-17    140  |  106  |  4[L]  ----------------------------<  100[H]  3.6   |  25  |  0.58    Ca    8.0[L]      17 Nov 2024 07:39  Phos  2.0     11-17  Mg     1.8     11-17        Urinalysis Basic - ( 17 Nov 2024 07:39 )    Color: x / Appearance: x / SG: x / pH: x  Gluc: 100 mg/dL / Ketone: x  / Bili: x / Urobili: x   Blood: x / Protein: x / Nitrite: x   Leuk Esterase: x / RBC: x / WBC x   Sq Epi: x / Non Sq Epi: x / Bacteria: x        RADIOLOGY & ADDITIONAL STUDIES:    40yo Female pt with 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 Will transfuse 1 unit for Hgb 6.6, but pt is mentating well with benign exam and stable VS. Will continue to monitor for signs of bleeding.     Consented for transfusion, f/u post transfusion CBC  CLD/IVF  Pain/nausea control prn  Doesnt take any home meds  SCDs, SQH currently held, OOBA  AM labs     **incomplete** INTERVAL HPI/OVERNIGHT EVENTS: AM hgb 6.3, repeat hgb 6.6    STATUS POST:  : RA lap endometriosis excision, uterine myomectomy, cyst fenestrations, rectal seromuscular repair, and flexible sigmoidoscopy     POST OPERATIVE DAY #: 3    SUBJECTIVE: Pt seen and examined by chief resident. Hgb low this AM but asymptomatic, VSS. Pt is feeling well, resting comfortably on bed. Pain controlled. Up OOB consistently. Gloria small amounts of liquids. Flatus ON but no BM. Nausea controlled with zofran. No complaints at this time.      MEDICATIONS  (STANDING):  heparin   Injectable 5000 Unit(s) SubCutaneous every 8 hours  influenza   Vaccine 0.5 milliLiter(s) IntraMuscular once  lactated ringers. 1000 milliLiter(s) (100 mL/Hr) IV Continuous <Continuous>  pantoprazole  Injectable 40 milliGRAM(s) IV Push every 24 hours    MEDICATIONS  (PRN):  acetaminophen     Tablet .. 1000 milliGRAM(s) Oral every 6 hours PRN Mild Pain (1 - 3)  ondansetron Injectable 4 milliGRAM(s) IV Push every 6 hours PRN Nausea and/or Vomiting  oxyCODONE    IR 5 milliGRAM(s) Oral every 6 hours PRN Moderate Pain (4 - 6)      Vital Signs Last 24 Hrs  T(C): 37.3 (17 Nov 2024 05:11), Max: 37.3 (17 Nov 2024 05:11)  T(F): 99.1 (17 Nov 2024 05:11), Max: 99.1 (17 Nov 2024 05:11)  HR: 82 (17 Nov 2024 03:40) (82 - 106)  BP: 110/61 (17 Nov 2024 03:40) (97/53 - 112/73)  BP(mean): 77 (17 Nov 2024 03:40) (69 - 86)  RR: 17 (17 Nov 2024 03:40) (17 - 18)  SpO2: 97% (17 Nov 2024 03:40) (95% - 99%)    Parameters below as of 17 Nov 2024 03:40  Patient On (Oxygen Delivery Method): room air        PHYSICAL EXAM:  Constitutional: A&Ox3, NAD  Respiratory: normal work of breathing  Cardiovascular: NSR, RRR  Abdomen: soft, non-tender, non distended, no rebound or gaurding  Legs: WWP, SCDs in place, no calf tenderness                I&O's Detail    16 Nov 2024 07:01  -  17 Nov 2024 07:00  --------------------------------------------------------  IN:  Total IN: 0 mL    OUT:    Voided (mL): 1500 mL  Total OUT: 1500 mL    Total NET: -1500 mL          LABS:                        6.6    9.07  )-----------( 169      ( 17 Nov 2024 07:39 )             21.1     11-17    140  |  106  |  4[L]  ----------------------------<  100[H]  3.6   |  25  |  0.58    Ca    8.0[L]      17 Nov 2024 07:39  Phos  2.0     11-17  Mg     1.8     11-17        Urinalysis Basic - ( 17 Nov 2024 07:39 )    Color: x / Appearance: x / SG: x / pH: x  Gluc: 100 mg/dL / Ketone: x  / Bili: x / Urobili: x   Blood: x / Protein: x / Nitrite: x   Leuk Esterase: x / RBC: x / WBC x   Sq Epi: x / Non Sq Epi: x / Bacteria: x        RADIOLOGY & ADDITIONAL STUDIES:    40yo Female pt with 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 Will transfuse 1 unit for Hgb 6.6, but pt is mentating well with benign exam and stable VS. Will continue to monitor for signs of bleeding.     Consented for transfusion, f/u post transfusion CBC  CLD/IVF  Pain/nausea control prn  Doesnt take any home meds  SCDs, SQH currently held, OOBA  AM labs

## 2024-11-17 NOTE — PROGRESS NOTE ADULT - ASSESSMENT
A&P: 38 y/o POD3 s/p RA endo excision, myomectomy, adenomyomectomy, b/l cystectomy, LS, RUSSELL, sigmoidoscopy, rectal serosal oversew by gen surg, cysto (EBL 1000)     Neuro: oxy PRN   CV: VSS   Pulm: RA   GI: CLD, LR 100cc/hr, miralax, zofran, +/-   : s/p israel, voiding   GYN: s/p RA endo excision, myomectomy   DVT ppx: SCDs, SQH TID      [ ] Recommend transfusion Hb<7  [ ] Replete Mg, phos, K  [ ] Diet per primary team, pt passing flatus  [x] GYN continue to follow     Emmanuel Story, PGY2  d/w attending

## 2024-11-17 NOTE — PROGRESS NOTE ADULT - SUBJECTIVE AND OBJECTIVE BOX
Pt seen and examined at bedside. Pt states mild abdominal pain but feels overall improved. She is tolerating clears but still has intermittent nausea, denies emesis overnight. She is ambulating and passing flatus. Voiding without issues.  Pt denies fever, chills, chest pain, SOB, nausea, vomiting, lightheadedness, dizziness.      T(F): 99.1 (11-17-24 @ 05:11), Max: 99.1 (11-17-24 @ 05:11)  HR: 82 (11-17-24 @ 03:40) (82 - 106)  BP: 110/61 (11-17-24 @ 03:40) (97/53 - 112/73)  RR: 17 (11-17-24 @ 03:40) (17 - 18)  SpO2: 97% (11-17-24 @ 03:40) (95% - 99%)  Wt(kg): --  I&O's Summary    16 Nov 2024 07:01  -  17 Nov 2024 07:00  --------------------------------------------------------  IN: 0 mL / OUT: 1500 mL / NET: -1500 mL        MEDICATIONS  (STANDING):  heparin   Injectable 5000 Unit(s) SubCutaneous every 8 hours  influenza   Vaccine 0.5 milliLiter(s) IntraMuscular once  lactated ringers. 1000 milliLiter(s) (100 mL/Hr) IV Continuous <Continuous>  pantoprazole  Injectable 40 milliGRAM(s) IV Push every 24 hours    MEDICATIONS  (PRN):  acetaminophen     Tablet .. 1000 milliGRAM(s) Oral every 6 hours PRN Mild Pain (1 - 3)  ondansetron Injectable 4 milliGRAM(s) IV Push every 6 hours PRN Nausea and/or Vomiting  oxyCODONE    IR 5 milliGRAM(s) Oral every 6 hours PRN Moderate Pain (4 - 6)      Physical Exam:  Constitutional: NAD  Pulm: No increased WOB  Abdomen: incision sites clean, dry, intact. Soft, moderate tenderness to palpation over RLQ and lower abd, nondistended, no guarding, no rebound  Extremities: no lower extremity edema or calf tenderness. SCDs in place     LABS:                        6.6    9.07  )-----------( 169      ( 17 Nov 2024 07:39 )             21.1     11-17    140  |  106  |  x   ----------------------------<  100[H]  3.6   |  25  |  0.58    Ca    7.7[L]      17 Nov 2024 05:30  Phos  2.0     11-17  Mg     1.8     11-17        Urinalysis Basic - ( 17 Nov 2024 07:39 )    Color: x / Appearance: x / SG: x / pH: x  Gluc: 100 mg/dL / Ketone: x  / Bili: x / Urobili: x   Blood: x / Protein: x / Nitrite: x   Leuk Esterase: x / RBC: x / WBC x   Sq Epi: x / Non Sq Epi: x / Bacteria: x

## 2024-11-18 LAB
ANION GAP SERPL CALC-SCNC: 10 MMOL/L — SIGNIFICANT CHANGE UP (ref 5–17)
BUN SERPL-MCNC: 4 MG/DL — LOW (ref 7–23)
CALCIUM SERPL-MCNC: 8.2 MG/DL — LOW (ref 8.4–10.5)
CHLORIDE SERPL-SCNC: 108 MMOL/L — SIGNIFICANT CHANGE UP (ref 96–108)
CO2 SERPL-SCNC: 23 MMOL/L — SIGNIFICANT CHANGE UP (ref 22–31)
CREAT SERPL-MCNC: 0.57 MG/DL — SIGNIFICANT CHANGE UP (ref 0.5–1.3)
EGFR: 120 ML/MIN/1.73M2 — SIGNIFICANT CHANGE UP
GLUCOSE SERPL-MCNC: 85 MG/DL — SIGNIFICANT CHANGE UP (ref 70–99)
HCT VFR BLD CALC: 23.9 % — LOW (ref 34.5–45)
HCT VFR BLD CALC: 24.9 % — LOW (ref 34.5–45)
HGB BLD-MCNC: 7.5 G/DL — LOW (ref 11.5–15.5)
HGB BLD-MCNC: 7.9 G/DL — LOW (ref 11.5–15.5)
MAGNESIUM SERPL-MCNC: 1.9 MG/DL — SIGNIFICANT CHANGE UP (ref 1.6–2.6)
MCHC RBC-ENTMCNC: 26.9 PG — LOW (ref 27–34)
MCHC RBC-ENTMCNC: 27.8 PG — SIGNIFICANT CHANGE UP (ref 27–34)
MCHC RBC-ENTMCNC: 31.4 G/DL — LOW (ref 32–36)
MCHC RBC-ENTMCNC: 31.7 G/DL — LOW (ref 32–36)
MCV RBC AUTO: 84.7 FL — SIGNIFICANT CHANGE UP (ref 80–100)
MCV RBC AUTO: 88.5 FL — SIGNIFICANT CHANGE UP (ref 80–100)
NRBC # BLD: 0 /100 WBCS — SIGNIFICANT CHANGE UP (ref 0–0)
NRBC # BLD: 0 /100 WBCS — SIGNIFICANT CHANGE UP (ref 0–0)
PHOSPHATE SERPL-MCNC: 3 MG/DL — SIGNIFICANT CHANGE UP (ref 2.5–4.5)
PLATELET # BLD AUTO: 112 K/UL — LOW (ref 150–400)
PLATELET # BLD AUTO: 181 K/UL — SIGNIFICANT CHANGE UP (ref 150–400)
POTASSIUM SERPL-MCNC: 4 MMOL/L — SIGNIFICANT CHANGE UP (ref 3.5–5.3)
POTASSIUM SERPL-SCNC: 4 MMOL/L — SIGNIFICANT CHANGE UP (ref 3.5–5.3)
RBC # BLD: 2.7 M/UL — LOW (ref 3.8–5.2)
RBC # BLD: 2.94 M/UL — LOW (ref 3.8–5.2)
RBC # FLD: 14.4 % — SIGNIFICANT CHANGE UP (ref 10.3–14.5)
RBC # FLD: 14.6 % — HIGH (ref 10.3–14.5)
SODIUM SERPL-SCNC: 141 MMOL/L — SIGNIFICANT CHANGE UP (ref 135–145)
WBC # BLD: 6.21 K/UL — SIGNIFICANT CHANGE UP (ref 3.8–10.5)
WBC # BLD: 6.23 K/UL — SIGNIFICANT CHANGE UP (ref 3.8–10.5)
WBC # FLD AUTO: 6.21 K/UL — SIGNIFICANT CHANGE UP (ref 3.8–10.5)
WBC # FLD AUTO: 6.23 K/UL — SIGNIFICANT CHANGE UP (ref 3.8–10.5)

## 2024-11-18 RX ORDER — NYSTATIN 500000 [USP'U]/1
500000 TABLET, FILM COATED ORAL THREE TIMES A DAY
Refills: 0 | Status: DISCONTINUED | OUTPATIENT
Start: 2024-11-18 | End: 2024-11-19

## 2024-11-18 RX ADMIN — Medication 100 MILLILITER(S): at 13:39

## 2024-11-18 RX ADMIN — Medication 100 MILLILITER(S): at 02:27

## 2024-11-18 RX ADMIN — NYSTATIN 500000 UNIT(S): 500000 TABLET, FILM COATED ORAL at 22:35

## 2024-11-18 RX ADMIN — Medication 100 GRAM(S): at 09:06

## 2024-11-18 NOTE — PROGRESS NOTE ADULT - ATTENDING COMMENTS
gyn attg:    patient stable postoperatively, acute blood loss anemia. transfused 1 unit PRBC. Continue care per primary team    Quintin Wiseman MD
Gyn attg:     patient with acute blood loss anemia s/p robotic assisted myomectomy, treatment of endometriosis, extensive lysis of adhesions, sigmoidoscopy, cystoscopy and repair of rectal serosal tear stable postoperatively  -care per primary team  -will continue to follow
gyn attg:    Patient seen and examined, she vomited endorsing some dizziness. Pain controlled.    abd: moderate distention    -continue clears  -repeat cbc  -consider transfusion

## 2024-11-18 NOTE — PROGRESS NOTE ADULT - ASSESSMENT
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     CLD/IVF  Pain/nausea control prn  Doesnt take any home meds  PPI  SCDs,holding SQH  AM labs  OOBA  Monitoring CBC and vitals,   Morning Hgb 7.5, Repeat CBC 2pm

## 2024-11-18 NOTE — PROGRESS NOTE ADULT - SUBJECTIVE AND OBJECTIVE BOX
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 Patient is now s/p procedure, with c/o abdominal pain controlled on current pain regiment, also c/o Right shoulder pain. Pt on tele monitoring due to blood loss intra op with post op normocytic anemia. Patient with sinus tachycardia, mildly low BPs. Pt denies chest pain / palpiatations / fevers / chills / N /V.     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 (14 Nov 2024 15:11)        PAST MEDICAL & SURGICAL HISTORY:  Fibroids  uterine      Anal fistula      Endometriosis      Ovarian cyst      Perianal abscess      H/O ovarian cystectomy  left      Anal fistula  surgery          Allergies    No Known Allergies    Intolerances      Vital Signs Last 24 Hrs  T(C): 36.9 (18 Nov 2024 09:08), Max: 36.9 (18 Nov 2024 09:08)  T(F): 98.5 (18 Nov 2024 09:08), Max: 98.5 (18 Nov 2024 09:08)  HR: 96 (18 Nov 2024 11:55) (76 - 104)  BP: 108/59 (18 Nov 2024 11:55) (92/52 - 130/93)  BP(mean): 76 (18 Nov 2024 11:55) (66 - 105)  RR: 18 (18 Nov 2024 11:55) (16 - 18)  SpO2: 100% (18 Nov 2024 11:55) (96% - 100%)    Parameters below as of 18 Nov 2024 11:55  Patient On (Oxygen Delivery Method): room air      MEDICATIONS  (STANDING):  influenza   Vaccine 0.5 milliLiter(s) IntraMuscular once  lactated ringers. 1000 milliLiter(s) (100 mL/Hr) IV Continuous <Continuous>  pantoprazole  Injectable 40 milliGRAM(s) IV Push every 24 hours    MEDICATIONS  (PRN):  acetaminophen     Tablet .. 1000 milliGRAM(s) Oral every 6 hours PRN Mild Pain (1 - 3)  ondansetron Injectable 4 milliGRAM(s) IV Push every 6 hours PRN Nausea and/or Vomiting  oxyCODONE    IR 5 milliGRAM(s) Oral every 6 hours PRN Moderate Pain (4 - 6)          I&O's Summary    14 Nov 2024 07:01  -  15 Nov 2024 07:00  --------------------------------------------------------  IN: 700 mL / OUT: 280 mL / NET: 420 mL        LABS:                        7.7    8.20  )-----------( 61       ( 15 Nov 2024 07:18 )             25.0     11-15    142  |  110[H]  |  6[L]  ----------------------------<  122[H]  3.6   |  23  |  0.69    Ca    7.8[L]      15 Nov 2024 07:18  Phos  3.7     11-15  Mg     2.0     11-15          Urinalysis Basic - ( 15 Nov 2024 07:18 )    Color: x / Appearance: x / SG: x / pH: x  Gluc: 122 mg/dL / Ketone: x  / Bili: x / Urobili: x   Blood: x / Protein: x / Nitrite: x   Leuk Esterase: x / RBC: x / WBC x   Sq Epi: x / Non Sq Epi: x / Bacteria: x      CAPILLARY BLOOD GLUCOSE      POCT Blood Glucose.: 89 mg/dL (14 Nov 2024 14:34)      Cultures:      PHYSICAL EXAM:  General: NAD, resting comfortably  HEENT: NC/AT, EOMI, normal hearing, no oral lesions, no LAD, neck supple  Pulmonary: Normal resp effort, CTA-B  Cardiovascular: NSR, tachycardia   Abdominal: Soft, ND, dressings CDI, post op tenderness, no organomegaly  Extremities: (+) DP/PT pulses. FROM, normal strength, no clubbing/cyanosis/erythema/edema  Neuro: A/O x 3, CNs II-XII grossly intact, normal sensation, no focal deficits  Pulses: Palpable distal pulses    RADIOLOGY & ADDITIONAL STUDIES:      ASSESSMENT:      PLAN:

## 2024-11-18 NOTE — PROGRESS NOTE ADULT - ASSESSMENT
38 y/o s/p RA endo excision, myomectomy, adenomyomectomy, b/l cystectomy, LS, RUSSELL, sigmoidoscopy, rectal serosal oversew by gen surg, cysto (EBL 1000)     Neuro: tylenol/oxy PRN     CV: VSS   Pulm: RA   GI: CLD, LR 100cc/hr, protonix, zofran, +/-   : s/p israel, voiding   GYN: s/p RA endo excision, myomectomy   Heme: s/p 1u pRBC 11/17   DVT ppx: SCDs    [ ] AM CBC/BMP/Mg/Phos 11/18 (ordered)   [ ] Diet per gen surg  [ ] Appreciate gen surg care  [x] Holding SQH iso Hb drop

## 2024-11-18 NOTE — PROGRESS NOTE ADULT - SUBJECTIVE AND OBJECTIVE BOX
POST-OP DAY: X s/p      SUBJECTIVE: Patient seen and examined bedside by chief resident.      MEDICATIONS  (PRN):  acetaminophen     Tablet .. 1000 milliGRAM(s) Oral every 6 hours PRN Mild Pain (1 - 3)  ondansetron Injectable 4 milliGRAM(s) IV Push every 6 hours PRN Nausea and/or Vomiting  oxyCODONE    IR 5 milliGRAM(s) Oral every 6 hours PRN Moderate Pain (4 - 6)      I&O's Detail    16 Nov 2024 07:01  -  17 Nov 2024 07:00  --------------------------------------------------------  IN:  Total IN: 0 mL    OUT:    Voided (mL): 1500 mL  Total OUT: 1500 mL    Total NET: -1500 mL      17 Nov 2024 07:01  -  18 Nov 2024 06:51  --------------------------------------------------------  IN:    Lactated Ringers: 700 mL    Oral Fluid: 350 mL  Total IN: 1050 mL    OUT:    Stool (mL): 0 mL    Voided (mL): 1325 mL  Total OUT: 1325 mL    Total NET: -275 mL          Vital Signs Last 24 Hrs  T(C): 36.6 (18 Nov 2024 05:24), Max: 37 (17 Nov 2024 09:07)  T(F): 97.8 (18 Nov 2024 05:24), Max: 98.6 (17 Nov 2024 09:07)  HR: 76 (18 Nov 2024 03:48) (76 - 106)  BP: 114/69 (18 Nov 2024 03:48) (101/59 - 130/93)  BP(mean): 86 (18 Nov 2024 03:48) (71 - 105)  RR: 17 (18 Nov 2024 03:48) (16 - 18)  SpO2: 97% (18 Nov 2024 03:48) (96% - 100%)    Parameters below as of 18 Nov 2024 03:48  Patient On (Oxygen Delivery Method): room air        General: NAD, resting comfortably in bed  C/V: NSR  Pulm: Nonlabored breathing, no respiratory distress  Abd: soft, NT/ND  Extrem: WWP, no edema, SCDs in place    LABS:                        7.5    6.21  )-----------( 112      ( 18 Nov 2024 05:30 )             23.9     11-18    141  |  108  |  4[L]  ----------------------------<  85  4.0   |  23  |  0.57    Ca    8.2[L]      18 Nov 2024 05:30  Phos  3.0     11-18  Mg     1.9     11-18        Urinalysis Basic - ( 18 Nov 2024 05:30 )    Color: x / Appearance: x / SG: x / pH: x  Gluc: 85 mg/dL / Ketone: x  / Bili: x / Urobili: x   Blood: x / Protein: x / Nitrite: x   Leuk Esterase: x / RBC: x / WBC x   Sq Epi: x / Non Sq Epi: x / Bacteria: x        RADIOLOGY & ADDITIONAL STUDIES:     POST-OP DAY: 4 s/p RA lap endometriosis excision, uterine myomectomy, cyst fenestrations, rectal seromuscular repair, and flexible sigmoidoscopy      SUBJECTIVE: Patient seen and examined bedside by chief resident resting comfortably in bed. Overnight CBC 8.0 from 7.6 (post transfusion). Patient tolerating clears without nausea or vomiting. Admits to passing flatus, denies bowel movement at this time. Denies light handedness weakness, dizziness. Pain is well controlled and she is voiding adequately.       MEDICATIONS  (PRN):  acetaminophen     Tablet .. 1000 milliGRAM(s) Oral every 6 hours PRN Mild Pain (1 - 3)  ondansetron Injectable 4 milliGRAM(s) IV Push every 6 hours PRN Nausea and/or Vomiting  oxyCODONE    IR 5 milliGRAM(s) Oral every 6 hours PRN Moderate Pain (4 - 6)      I&O's Detail    16 Nov 2024 07:01  -  17 Nov 2024 07:00  --------------------------------------------------------  IN:  Total IN: 0 mL    OUT:    Voided (mL): 1500 mL  Total OUT: 1500 mL    Total NET: -1500 mL      17 Nov 2024 07:01  -  18 Nov 2024 06:51  --------------------------------------------------------  IN:    Lactated Ringers: 700 mL    Oral Fluid: 350 mL  Total IN: 1050 mL    OUT:    Stool (mL): 0 mL    Voided (mL): 1325 mL  Total OUT: 1325 mL    Total NET: -275 mL          Vital Signs Last 24 Hrs  T(C): 36.6 (18 Nov 2024 05:24), Max: 37 (17 Nov 2024 09:07)  T(F): 97.8 (18 Nov 2024 05:24), Max: 98.6 (17 Nov 2024 09:07)  HR: 76 (18 Nov 2024 03:48) (76 - 106)  BP: 114/69 (18 Nov 2024 03:48) (101/59 - 130/93)  BP(mean): 86 (18 Nov 2024 03:48) (71 - 105)  RR: 17 (18 Nov 2024 03:48) (16 - 18)  SpO2: 97% (18 Nov 2024 03:48) (96% - 100%)    Parameters below as of 18 Nov 2024 03:48  Patient On (Oxygen Delivery Method): room air        General: NAD, resting comfortably in bed  C/V: NSR  Pulm: Nonlabored breathing, no respiratory distress  Abd: soft, non-tender, nondistended. Incsions clean, dry, intact without purulence, drainage hematoma, bleeding, erythema, ecchymosis or induration.  Extrem: WWP, no edema, SCDs in place    LABS:                        7.5    6.21  )-----------( 112      ( 18 Nov 2024 05:30 )             23.9     11-18    141  |  108  |  4[L]  ----------------------------<  85  4.0   |  23  |  0.57    Ca    8.2[L]      18 Nov 2024 05:30  Phos  3.0     11-18  Mg     1.9     11-18        Urinalysis Basic - ( 18 Nov 2024 05:30 )    Color: x / Appearance: x / SG: x / pH: x  Gluc: 85 mg/dL / Ketone: x  / Bili: x / Urobili: x   Blood: x / Protein: x / Nitrite: x   Leuk Esterase: x / RBC: x / WBC x   Sq Epi: x / Non Sq Epi: x / Bacteria: x        RADIOLOGY & ADDITIONAL STUDIES:

## 2024-11-18 NOTE — PROGRESS NOTE ADULT - SUBJECTIVE AND OBJECTIVE BOX
Pt seen and examined at bedside. Pt states minimal abdominal pain, feeling well. Pt tolerating CLD, would like to eat today. Pt ambulating, passing flatus, urinating adequately. Not yet having bowel movements.   Pt denies fever, chills, chest pain, SOB, nausea, vomiting, lightheadedness, dizziness.      T(F): 97.8 (11-18-24 @ 05:24), Max: 98.6 (11-17-24 @ 09:07)  HR: 76 (11-18-24 @ 03:48) (76 - 106)  BP: 114/69 (11-18-24 @ 03:48) (101/59 - 130/93)  RR: 17 (11-18-24 @ 03:48) (16 - 18)  SpO2: 97% (11-18-24 @ 03:48) (96% - 100%)  Wt(kg): --  I&O's Summary    16 Nov 2024 07:01  -  17 Nov 2024 07:00  --------------------------------------------------------  IN: 0 mL / OUT: 1500 mL / NET: -1500 mL    17 Nov 2024 07:01  -  18 Nov 2024 06:32  --------------------------------------------------------  IN: 1050 mL / OUT: 1325 mL / NET: -275 mL        MEDICATIONS  (STANDING):  influenza   Vaccine 0.5 milliLiter(s) IntraMuscular once  lactated ringers. 1000 milliLiter(s) (100 mL/Hr) IV Continuous <Continuous>  pantoprazole  Injectable 40 milliGRAM(s) IV Push every 24 hours    MEDICATIONS  (PRN):  acetaminophen     Tablet .. 1000 milliGRAM(s) Oral every 6 hours PRN Mild Pain (1 - 3)  ondansetron Injectable 4 milliGRAM(s) IV Push every 6 hours PRN Nausea and/or Vomiting  oxyCODONE    IR 5 milliGRAM(s) Oral every 6 hours PRN Moderate Pain (4 - 6)      Physical Exam:  Constitutional: NAD  Pulmonary: no increased work of breathing  Cardiovascular: Regular rate  Abdomen: incision sites clean, dry, intact. Soft, appropriately tender, nondistended, no guarding, no rebound, + bowel sounds  Extremities: no lower extremity edema or calf tenderness    LABS:                        8.0    8.28  )-----------( 118      ( 17 Nov 2024 20:00 )             25.5     11-17    140  |  106  |  4[L]  ----------------------------<  100[H]  3.6   |  25  |  0.58    Ca    8.0[L]      17 Nov 2024 07:39  Phos  2.0     11-17  Mg     1.8     11-17        Urinalysis Basic - ( 17 Nov 2024 07:39 )    Color: x / Appearance: x / SG: x / pH: x  Gluc: 100 mg/dL / Ketone: x  / Bili: x / Urobili: x   Blood: x / Protein: x / Nitrite: x   Leuk Esterase: x / RBC: x / WBC x   Sq Epi: x / Non Sq Epi: x / Bacteria: x

## 2024-11-19 ENCOUNTER — TRANSCRIPTION ENCOUNTER (OUTPATIENT)
Age: 39
End: 2024-11-19

## 2024-11-19 VITALS — TEMPERATURE: 98 F

## 2024-11-19 LAB
ANION GAP SERPL CALC-SCNC: 10 MMOL/L — SIGNIFICANT CHANGE UP (ref 5–17)
BUN SERPL-MCNC: 6 MG/DL — LOW (ref 7–23)
CALCIUM SERPL-MCNC: 8.3 MG/DL — LOW (ref 8.4–10.5)
CHLORIDE SERPL-SCNC: 107 MMOL/L — SIGNIFICANT CHANGE UP (ref 96–108)
CO2 SERPL-SCNC: 23 MMOL/L — SIGNIFICANT CHANGE UP (ref 22–31)
CREAT SERPL-MCNC: 0.61 MG/DL — SIGNIFICANT CHANGE UP (ref 0.5–1.3)
EGFR: 118 ML/MIN/1.73M2 — SIGNIFICANT CHANGE UP
GLUCOSE SERPL-MCNC: 103 MG/DL — HIGH (ref 70–99)
HCT VFR BLD CALC: 26.7 % — LOW (ref 34.5–45)
HGB BLD-MCNC: 8.4 G/DL — LOW (ref 11.5–15.5)
MAGNESIUM SERPL-MCNC: 2.2 MG/DL — SIGNIFICANT CHANGE UP (ref 1.6–2.6)
MCHC RBC-ENTMCNC: 27.9 PG — SIGNIFICANT CHANGE UP (ref 27–34)
MCHC RBC-ENTMCNC: 31.5 G/DL — LOW (ref 32–36)
MCV RBC AUTO: 88.7 FL — SIGNIFICANT CHANGE UP (ref 80–100)
NRBC # BLD: 0 /100 WBCS — SIGNIFICANT CHANGE UP (ref 0–0)
PHOSPHATE SERPL-MCNC: 3.1 MG/DL — SIGNIFICANT CHANGE UP (ref 2.5–4.5)
PLATELET # BLD AUTO: 161 K/UL — SIGNIFICANT CHANGE UP (ref 150–400)
POTASSIUM SERPL-MCNC: 4.4 MMOL/L — SIGNIFICANT CHANGE UP (ref 3.5–5.3)
POTASSIUM SERPL-SCNC: 4.4 MMOL/L — SIGNIFICANT CHANGE UP (ref 3.5–5.3)
RBC # BLD: 3.01 M/UL — LOW (ref 3.8–5.2)
RBC # FLD: 14.8 % — HIGH (ref 10.3–14.5)
SODIUM SERPL-SCNC: 140 MMOL/L — SIGNIFICANT CHANGE UP (ref 135–145)
WBC # BLD: 5.76 K/UL — SIGNIFICANT CHANGE UP (ref 3.8–10.5)
WBC # FLD AUTO: 5.76 K/UL — SIGNIFICANT CHANGE UP (ref 3.8–10.5)

## 2024-11-19 PROCEDURE — 86850 RBC ANTIBODY SCREEN: CPT

## 2024-11-19 PROCEDURE — 88360 TUMOR IMMUNOHISTOCHEM/MANUAL: CPT

## 2024-11-19 PROCEDURE — 82947 ASSAY GLUCOSE BLOOD QUANT: CPT

## 2024-11-19 PROCEDURE — 36415 COLL VENOUS BLD VENIPUNCTURE: CPT

## 2024-11-19 PROCEDURE — 82962 GLUCOSE BLOOD TEST: CPT

## 2024-11-19 PROCEDURE — 84100 ASSAY OF PHOSPHORUS: CPT

## 2024-11-19 PROCEDURE — 88342 IMHCHEM/IMCYTCHM 1ST ANTB: CPT

## 2024-11-19 PROCEDURE — 85027 COMPLETE CBC AUTOMATED: CPT

## 2024-11-19 PROCEDURE — 86900 BLOOD TYPING SEROLOGIC ABO: CPT

## 2024-11-19 PROCEDURE — 82375 ASSAY CARBOXYHB QUANT: CPT

## 2024-11-19 PROCEDURE — C1889: CPT

## 2024-11-19 PROCEDURE — 83050 HGB METHEMOGLOBIN QUAN: CPT

## 2024-11-19 PROCEDURE — 80048 BASIC METABOLIC PNL TOTAL CA: CPT

## 2024-11-19 PROCEDURE — 86901 BLOOD TYPING SEROLOGIC RH(D): CPT

## 2024-11-19 PROCEDURE — 84132 ASSAY OF SERUM POTASSIUM: CPT

## 2024-11-19 PROCEDURE — P9016: CPT

## 2024-11-19 PROCEDURE — 88341 IMHCHEM/IMCYTCHM EA ADD ANTB: CPT

## 2024-11-19 PROCEDURE — 36430 TRANSFUSION BLD/BLD COMPNT: CPT

## 2024-11-19 PROCEDURE — 88305 TISSUE EXAM BY PATHOLOGIST: CPT

## 2024-11-19 PROCEDURE — S2900: CPT

## 2024-11-19 PROCEDURE — 86923 COMPATIBILITY TEST ELECTRIC: CPT

## 2024-11-19 PROCEDURE — 82803 BLOOD GASES ANY COMBINATION: CPT

## 2024-11-19 PROCEDURE — 82330 ASSAY OF CALCIUM: CPT

## 2024-11-19 PROCEDURE — 83735 ASSAY OF MAGNESIUM: CPT

## 2024-11-19 PROCEDURE — 84295 ASSAY OF SERUM SODIUM: CPT

## 2024-11-19 PROCEDURE — 85018 HEMOGLOBIN: CPT

## 2024-11-19 RX ORDER — FERROUS SULFATE 325(65) MG
1 TABLET ORAL
Qty: 30 | Refills: 0
Start: 2024-11-19

## 2024-11-19 RX ORDER — ACETAMINOPHEN 500MG 500 MG/1
2 TABLET, COATED ORAL
Qty: 0 | Refills: 0 | DISCHARGE
Start: 2024-11-19

## 2024-11-19 RX ORDER — ACETAMINOPHEN 500MG 500 MG/1
2 TABLET, COATED ORAL
Refills: 0 | DISCHARGE

## 2024-11-19 RX ORDER — PANTOPRAZOLE SODIUM 40 MG/1
1 TABLET, DELAYED RELEASE ORAL
Qty: 30 | Refills: 0
Start: 2024-11-19 | End: 2024-12-18

## 2024-11-19 RX ORDER — OXYCODONE HYDROCHLORIDE 30 MG/1
1 TABLET ORAL
Qty: 10 | Refills: 0
Start: 2024-11-19

## 2024-11-19 RX ORDER — DOCUSATE SODIUM 100 MG
1 CAPSULE ORAL
Qty: 14 | Refills: 0
Start: 2024-11-19

## 2024-11-19 RX ADMIN — NYSTATIN 500000 UNIT(S): 500000 TABLET, FILM COATED ORAL at 05:46

## 2024-11-19 RX ADMIN — NYSTATIN 500000 UNIT(S): 500000 TABLET, FILM COATED ORAL at 14:43

## 2024-11-19 NOTE — PROGRESS NOTE ADULT - SUBJECTIVE AND OBJECTIVE BOX
Pt seen and examined at bedside. Pt states mild abdominal pain. Pt ambulating, tolerating diet, + flatus, + urinating adequately.   Pt denies fever, chills, chest pain, SOB, nausea, vomiting, lightheadedness, dizziness.      T(F): 98.4 (11-19-24 @ 05:30), Max: 98.5 (11-18-24 @ 09:08)  HR: 72 (11-19-24 @ 04:00) (72 - 100)  BP: 103/59 (11-19-24 @ 03:50) (92/52 - 133/68)  RR: 17 (11-19-24 @ 03:50) (16 - 18)  SpO2: 97% (11-19-24 @ 04:00) (97% - 100%)  Wt(kg): --  I&O's Summary    18 Nov 2024 07:01  -  19 Nov 2024 07:00  --------------------------------------------------------  IN: 750 mL / OUT: 2350 mL / NET: -1600 mL        MEDICATIONS  (STANDING):  influenza   Vaccine 0.5 milliLiter(s) IntraMuscular once  nystatin    Suspension 180946 Unit(s) Oral three times a day  pantoprazole  Injectable 40 milliGRAM(s) IV Push every 24 hours    MEDICATIONS  (PRN):  acetaminophen     Tablet .. 1000 milliGRAM(s) Oral every 6 hours PRN Mild Pain (1 - 3)  ondansetron Injectable 4 milliGRAM(s) IV Push every 6 hours PRN Nausea and/or Vomiting  oxyCODONE    IR 5 milliGRAM(s) Oral every 6 hours PRN Moderate Pain (4 - 6)      Physical Exam:  Constitutional: NAD  Pulmonary: no increased WOB  Abdomen: incision site clean, dry, intact. Soft, nontender, nondistended, no guarding, no rebound, normoactive bowel sounds  Extremities: no lower extremity edema or calf tenderness. SCDs in place     LABS:                        7.9    6.23  )-----------( 181      ( 18 Nov 2024 14:00 )             24.9     11-18    141  |  108  |  4[L]  ----------------------------<  85  4.0   |  23  |  0.57    Ca    8.2[L]      18 Nov 2024 05:30  Phos  3.0     11-18  Mg     1.9     11-18        Urinalysis Basic - ( 18 Nov 2024 05:30 )    Color: x / Appearance: x / SG: x / pH: x  Gluc: 85 mg/dL / Ketone: x  / Bili: x / Urobili: x   Blood: x / Protein: x / Nitrite: x   Leuk Esterase: x / RBC: x / WBC x   Sq Epi: x / Non Sq Epi: x / Bacteria: x        RADIOLOGY & ADDITIONAL TESTS:

## 2024-11-19 NOTE — PROGRESS NOTE ADULT - REASON FOR ADMISSION
endometriosis, uterine fibroids

## 2024-11-19 NOTE — DISCHARGE NOTE NURSING/CASE MANAGEMENT/SOCIAL WORK - FINANCIAL ASSISTANCE
[facility] provides services at a reduced cost to those who are determined to be eligible through [facility]’s financial assistance program. Information regarding [facility]’s financial assistance program can be found by going to [email or web address] or by calling [phone]. 36.5

## 2024-11-19 NOTE — PROGRESS NOTE ADULT - ASSESSMENT
38 y/o s/p RA endo excision, myomectomy, adenomyomectomy, b/l cystectomy, LS, RUSSELL, sigmoidoscopy, rectal serosal oversew by gen surg, cysto     Neuro: tylenol/oxy PRN   CV: VSS  Pulm: RA   GI: reg , LR 100cc/hr, protonix, zofran, +/-   : s/p israel, voiding   GYN: s/p RA endo excision, myomectomy   Heme: s/p 1u pRBC 11/17   ID: oral nystatin for ?thrush   DVT ppx: SCDs      Plan per general surgery.

## 2024-11-19 NOTE — PROGRESS NOTE ADULT - ASSESSMENT
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     Regular  Pain/nausea control prn  PPI  SCDs,holding SQH  AM labs  dc home today

## 2024-11-19 NOTE — PROGRESS NOTE ADULT - PROVIDER SPECIALTY LIST ADULT
GYN
Surgery
GYN
GYN
Internal Medicine
Internal Medicine
Surgery
GYN
GYN
Surgery
Surgery

## 2024-11-19 NOTE — DISCHARGE NOTE NURSING/CASE MANAGEMENT/SOCIAL WORK - PATIENT PORTAL LINK FT
You can access the FollowMyHealth Patient Portal offered by [facility] by registering at the following website: [email or web address]. By joining [facility]’s FollowMyHealth portal, you will also be able to view your health information using other applications (apps) compatible with our system.

## 2024-11-19 NOTE — PROGRESS NOTE ADULT - SUBJECTIVE AND OBJECTIVE BOX
INTERVAL HPI/OVERNIGHT EVENTS: +F/-BM, ooba, -n/-v, oral thrush nystatin given, soo reg diet    STATUS POST:  RA lap endometriosis excision, uterine myomectomy, cyst fenestrations, rectal seromuscular repair, and flexible sigmoidoscopy     POST OPERATIVE DAY #: 5    SUBJECTIVE:  pt seen sitting in chair, feeling well. denies nausea/vomiting, tolerating diet. some pain overnight but well controlled. ambulating. +flatus    MEDICATIONS  (STANDING):  influenza   Vaccine 0.5 milliLiter(s) IntraMuscular once  nystatin    Suspension [id number] Unit(s) Oral three times a day  pantoprazole  Injectable 40 milliGRAM(s) IV Push every 24 hours    MEDICATIONS  (PRN):  acetaminophen     Tablet .. 1000 milliGRAM(s) Oral every 6 hours PRN Mild Pain (1 - 3)  ondansetron Injectable 4 milliGRAM(s) IV Push every 6 hours PRN Nausea and/or Vomiting  oxyCODONE    IR 5 milliGRAM(s) Oral every 6 hours PRN Moderate Pain (4 - 6)      Vital Signs Last 24 Hrs  T(C): 36.9 (19 Nov 2024 05:30), Max: 36.9 (18 Nov 2024 09:08)  T(F): 98.4 (19 Nov 2024 05:30), Max: 98.5 (18 Nov 2024 09:08)  HR: 72 (19 Nov 2024 04:00) (72 - 100)  BP: 103/59 (19 Nov 2024 03:50) (92/52 - 133/68)  BP(mean): 74 (19 Nov 2024 03:50) (66 - 92)  RR: 17 (19 Nov 2024 03:50) (16 - 18)  SpO2: 97% (19 Nov 2024 04:00) (97% - 100%)    Parameters below as of 19 Nov 2024 03:50  Patient On (Oxygen Delivery Method): room air        PHYSICAL EXAM:      Constitutional: A&Ox3    Respiratory: non labored breathing, no respiratory distress    Cardiovascular: NSR, RRR    Gastrointestinal: soft, nondistended, appropriate incisional tenderness, incisions c/d/i    Extremities: (-) edema                  I&O's Detail    18 Nov 2024 07:01  -  19 Nov 2024 07:00  --------------------------------------------------------  IN:    Lactated Ringers: 400 mL    Oral Fluid: 350 mL  Total IN: 750 mL    OUT:    Stool (mL): 0 mL    Voided (mL): 2350 mL  Total OUT: 2350 mL    Total NET: -1600 mL          LABS:                        7.9    6.23  )-----------( 181      ( 18 Nov 2024 14:00 )             24.9     11-18    141  |  108  |  4[L]  ----------------------------<  85  4.0   |  23  |  0.57    Ca    8.2[L]      18 Nov 2024 05:30  Phos  3.0     11-18  Mg     1.9     11-18        Urinalysis Basic - ( 18 Nov 2024 05:30 )    Color: x / Appearance: x / SG: x / pH: x  Gluc: 85 mg/dL / Ketone: x  / Bili: x / Urobili: x   Blood: x / Protein: x / Nitrite: x   Leuk Esterase: x / RBC: x / WBC x   Sq Epi: x / Non Sq Epi: x / Bacteria: x        RADIOLOGY & ADDITIONAL STUDIES:

## 2024-11-25 DIAGNOSIS — D62 ACUTE POSTHEMORRHAGIC ANEMIA: ICD-10-CM

## 2024-11-25 DIAGNOSIS — N80.03 ADENOMYOSIS OF THE UTERUS: ICD-10-CM

## 2024-11-25 DIAGNOSIS — K62.89 OTHER SPECIFIED DISEASES OF ANUS AND RECTUM: ICD-10-CM

## 2024-11-25 DIAGNOSIS — D25.9 LEIOMYOMA OF UTERUS, UNSPECIFIED: ICD-10-CM

## 2024-11-25 DIAGNOSIS — N80.9 ENDOMETRIOSIS, UNSPECIFIED: ICD-10-CM

## 2024-11-25 DIAGNOSIS — N94.89 OTHER SPECIFIED CONDITIONS ASSOCIATED WITH FEMALE GENITAL ORGANS AND MENSTRUAL CYCLE: ICD-10-CM

## 2024-11-25 DIAGNOSIS — N80.123 DEEP ENDOMETRIOSIS OF BILATERAL OVARIES: ICD-10-CM

## 2024-11-25 DIAGNOSIS — B37.0 CANDIDAL STOMATITIS: ICD-10-CM

## 2024-11-26 ENCOUNTER — APPOINTMENT (OUTPATIENT)
Dept: OBGYN | Facility: CLINIC | Age: 39
End: 2024-11-26

## 2024-12-02 ENCOUNTER — APPOINTMENT (OUTPATIENT)
Dept: OBGYN | Facility: CLINIC | Age: 39
End: 2024-12-02
Payer: COMMERCIAL

## 2024-12-02 VITALS
DIASTOLIC BLOOD PRESSURE: 70 MMHG | BODY MASS INDEX: 25.34 KG/M2 | OXYGEN SATURATION: 99 % | HEIGHT: 63 IN | WEIGHT: 143 LBS | HEART RATE: 96 BPM | SYSTOLIC BLOOD PRESSURE: 116 MMHG

## 2024-12-02 DIAGNOSIS — N80.129 DEEP ENDOMETRIOSIS OF OVARY, UNSPECIFIED OVARY: ICD-10-CM

## 2024-12-02 DIAGNOSIS — N80.50: ICD-10-CM

## 2024-12-02 DIAGNOSIS — D25.1 INTRAMURAL LEIOMYOMA OF UTERUS: ICD-10-CM

## 2024-12-02 DIAGNOSIS — N80.9 ENDOMETRIOSIS, UNSPECIFIED: ICD-10-CM

## 2024-12-02 DIAGNOSIS — N70.11 CHRONIC SALPINGITIS: ICD-10-CM

## 2024-12-02 PROCEDURE — 99024 POSTOP FOLLOW-UP VISIT: CPT

## 2024-12-05 PROBLEM — K61.0 ANAL ABSCESS: Chronic | Status: ACTIVE | Noted: 2024-11-13

## 2024-12-05 PROBLEM — N80.9 ENDOMETRIOSIS, UNSPECIFIED: Chronic | Status: ACTIVE | Noted: 2024-11-13

## 2024-12-24 ENCOUNTER — APPOINTMENT (OUTPATIENT)
Dept: OBGYN | Facility: CLINIC | Age: 39
End: 2024-12-24

## 2024-12-24 VITALS
BODY MASS INDEX: 25.34 KG/M2 | DIASTOLIC BLOOD PRESSURE: 75 MMHG | OXYGEN SATURATION: 99 % | SYSTOLIC BLOOD PRESSURE: 114 MMHG | WEIGHT: 143 LBS | HEIGHT: 63 IN | HEART RATE: 87 BPM

## 2024-12-24 PROCEDURE — 99024 POSTOP FOLLOW-UP VISIT: CPT

## 2025-01-16 ENCOUNTER — APPOINTMENT (OUTPATIENT)
Dept: HUMAN REPRODUCTION | Facility: CLINIC | Age: 40
End: 2025-01-16

## 2025-02-03 ENCOUNTER — APPOINTMENT (OUTPATIENT)
Dept: HUMAN REPRODUCTION | Facility: CLINIC | Age: 40
End: 2025-02-03
Payer: COMMERCIAL

## 2025-02-03 PROCEDURE — 36415 COLL VENOUS BLD VENIPUNCTURE: CPT

## 2025-02-03 PROCEDURE — 99205 OFFICE O/P NEW HI 60 MIN: CPT | Mod: 25

## 2025-02-03 PROCEDURE — 76830 TRANSVAGINAL US NON-OB: CPT

## 2025-02-11 ENCOUNTER — APPOINTMENT (OUTPATIENT)
Dept: OBGYN | Facility: CLINIC | Age: 40
End: 2025-02-11

## 2025-02-11 VITALS
DIASTOLIC BLOOD PRESSURE: 75 MMHG | HEART RATE: 98 BPM | HEIGHT: 63 IN | BODY MASS INDEX: 26.93 KG/M2 | SYSTOLIC BLOOD PRESSURE: 108 MMHG | WEIGHT: 152 LBS | OXYGEN SATURATION: 97 %

## 2025-02-11 DIAGNOSIS — Z86.19 PERSONAL HISTORY OF OTHER INFECTIOUS AND PARASITIC DISEASES: ICD-10-CM

## 2025-02-11 DIAGNOSIS — B37.31 ACUTE CANDIDIASIS OF VULVA AND VAGINA: ICD-10-CM

## 2025-02-11 DIAGNOSIS — N89.8 OTHER SPECIFIED NONINFLAMMATORY DISORDERS OF VAGINA: ICD-10-CM

## 2025-02-11 PROCEDURE — 99213 OFFICE O/P EST LOW 20 MIN: CPT | Mod: 24

## 2025-02-11 RX ORDER — TERCONAZOLE 8 MG/G
0.8 CREAM VAGINAL
Qty: 1 | Refills: 4 | Status: ACTIVE | COMMUNITY
Start: 2025-02-11 | End: 1900-01-01

## 2025-02-11 RX ORDER — FLUCONAZOLE 150 MG/1
150 TABLET ORAL
Qty: 3 | Refills: 5 | Status: ACTIVE | COMMUNITY
Start: 2025-02-11 | End: 1900-01-01

## 2025-02-12 LAB
BV BACTERIA RRNA VAG QL NAA+PROBE: NOT DETECTED
C GLABRATA RNA VAG QL NAA+PROBE: NOT DETECTED
C TRACH RRNA SPEC QL NAA+PROBE: NOT DETECTED
CANDIDA RRNA VAG QL PROBE: DETECTED
N GONORRHOEA RRNA SPEC QL NAA+PROBE: NOT DETECTED
T VAGINALIS RRNA SPEC QL NAA+PROBE: NOT DETECTED

## 2025-03-16 NOTE — PATIENT PROFILE ADULT - DEAF OR HARD OF HEARING?
no ENT consult recalled to re-evaluate   speech and swallow eval 3/13  3/14 FEES today= Passed regular diet  all meals OOB in chair

## (undated) DEVICE — DRAPE TOP SHEET 53" X 101"

## (undated) DEVICE — SUT ETHIBOND 0 30" CT-1 GREEN

## (undated) DEVICE — DRAPE 1/2 SHEET 40X57"

## (undated) DEVICE — DRSG CURITY GAUZE SPONGE 4 X 4" 12-PLY NON-STERILE

## (undated) DEVICE — LABELS BLANK W PEN

## (undated) DEVICE — XI DRAPE 4 ARM PROXIMAL

## (undated) DEVICE — CATH IV SAFE INSYTE 14G X 1.75" (ORANGE)

## (undated) DEVICE — NDL SPINAL 22G X 3.5" (BLACK)

## (undated) DEVICE — XI STAPLER SUREFORM 60

## (undated) DEVICE — GLV 7.5 PROTEXIS (WHITE)

## (undated) DEVICE — VENODYNE/SCD SLEEVE CALF MEDIUM

## (undated) DEVICE — PACK PERI GYN

## (undated) DEVICE — SOL IRR POUR H2O 250ML

## (undated) DEVICE — POSITIONER FOAM EGG CRATE ULNAR 2PCS (PINK)

## (undated) DEVICE — ANESTHESIA CIRCUIT ADULT HMEF

## (undated) DEVICE — TROCAR COVIDIEN VERSAONE FIXATION CANNULA 5MM

## (undated) DEVICE — GLV 8 PROTEXIS (WHITE)

## (undated) DEVICE — SUCTION YANKAUER OPEN TIP NO VENT CURVE

## (undated) DEVICE — TAPE SILK 2"

## (undated) DEVICE — PREP BETADINE SPONGE STICKS

## (undated) DEVICE — TAPE SILK 3"

## (undated) DEVICE — WARMING BLANKET UPPER ADULT

## (undated) DEVICE — CLIPPER BLADE GENERAL USE

## (undated) DEVICE — DRSG TAPE MEDIPORE 3"

## (undated) DEVICE — STOPCOCK 4-WAY

## (undated) DEVICE — SUT MONOCRYL 4-0 27" PS-2 UNDYED

## (undated) DEVICE — GLV 7.5 PROTEXIS (BLUE)

## (undated) DEVICE — PACK MINOR WITH LAP

## (undated) DEVICE — XI TIP COVER

## (undated) DEVICE — DRSG DERMABOND 0.7ML

## (undated) DEVICE — XI SEAL UNIVERSIAL 5-12MM

## (undated) DEVICE — ENDOCATCH 10MM SPECIMEN POUCH

## (undated) DEVICE — SUT PDS II 1 48" TP-1

## (undated) DEVICE — BLADE SURGICAL #15 CARBON

## (undated) DEVICE — DRAPE INSTRUMENT POUCH 6.75" X 11"

## (undated) DEVICE — DRAPE LAPAROTOMY TRANSVERSE

## (undated) DEVICE — MEDICATION LABELS W MARKER

## (undated) DEVICE — TROCAR COVIDIEN VERSAPORT BLADELESS OPTICAL 5MM STANDARD

## (undated) DEVICE — CANISTER DISPOSABLE THIN WALL 3000CC

## (undated) DEVICE — D HELP - CLEARVIEW CLEARIFY SYSTEM

## (undated) DEVICE — ELCTR BOVIE BLADE 3/4" EXTENDED LENGTH 6"

## (undated) DEVICE — PACK MINOR

## (undated) DEVICE — APPLICATOR FOR ARISTA XL-R RIGID 38CM

## (undated) DEVICE — MARKING PEN W RULER

## (undated) DEVICE — XI VESSEL SEALER

## (undated) DEVICE — SUT SILK 0 18" TIES

## (undated) DEVICE — DRSG COMBINE 5X9"

## (undated) DEVICE — FOLEY TRAY 16FR 5CC LF UMETER CLOSED

## (undated) DEVICE — DRSG TELFA 3 X 8

## (undated) DEVICE — POSITIONER PATIENT SAFETY STRAP 3X60"

## (undated) DEVICE — VESSEL LOOP EXTRA MAXI-BLUE 0.200" X 22"

## (undated) DEVICE — DRSG TAPE MEDIPORE 6"

## (undated) DEVICE — SOL IRR POUR NS 0.9% 500ML

## (undated) DEVICE — CATH IV SAFE BC 18G X 1.16" (GREEN)

## (undated) DEVICE — SUT MONOCRYL 4-0 18" P-3 UNDYED

## (undated) DEVICE — ELCTR BOVIE TIP BLADE INSULATED 2.8" EDGE WITH SAFETY

## (undated) DEVICE — SPECIMEN CONTAINER 4OZ

## (undated) DEVICE — LIGASURE BLUNT TIP 5MM X 37CM

## (undated) DEVICE — TUBING STRYKER PNEUMOCLEAR HIGH FLOW

## (undated) DEVICE — ELCTR BOVIE PENCIL BLADE 10FT

## (undated) DEVICE — SUT POLYSORB 3-0 30" V-20 UNDYED

## (undated) DEVICE — XI DRAPE COLUMN

## (undated) DEVICE — VESSEL LOOP MAXI-RED  0.120" X 16"

## (undated) DEVICE — GOWN XL

## (undated) DEVICE — INSUFFLATION NDL COVIDIEN SURGINEEDLE VERESS 120MM

## (undated) DEVICE — DRAPE 3/4 SHEET 52X76"

## (undated) DEVICE — LUBRICATING JELLY ONESHOT 1.25OZ

## (undated) DEVICE — TUBING SUCTION NONCONDUCTIVE 6MM X 12FT

## (undated) DEVICE — SUT VICRYL 2-0 27" SH UNDYED

## (undated) DEVICE — SYR LUER LOK 30CC

## (undated) DEVICE — SUT CHROMIC 3-0 30" V-20

## (undated) DEVICE — DRAPE TOWEL BLUE 17" X 24"

## (undated) DEVICE — STAPLER SKIN PROXIMATE

## (undated) DEVICE — SPECIMEN CONTAINER 100ML

## (undated) DEVICE — PACK GENERAL LAPAROSCOPY

## (undated) DEVICE — DRAPE THYROID 77" X 123"

## (undated) DEVICE — Device